# Patient Record
Sex: MALE | Race: WHITE | NOT HISPANIC OR LATINO | Employment: FULL TIME | URBAN - METROPOLITAN AREA
[De-identification: names, ages, dates, MRNs, and addresses within clinical notes are randomized per-mention and may not be internally consistent; named-entity substitution may affect disease eponyms.]

---

## 2021-02-25 ENCOUNTER — APPOINTMENT (OUTPATIENT)
Dept: RADIOLOGY | Facility: MEDICAL CENTER | Age: 36
End: 2021-02-25
Attending: EMERGENCY MEDICINE
Payer: OTHER GOVERNMENT

## 2021-02-25 ENCOUNTER — HOSPITAL ENCOUNTER (EMERGENCY)
Facility: MEDICAL CENTER | Age: 36
End: 2021-02-25
Attending: EMERGENCY MEDICINE | Admitting: EMERGENCY MEDICINE
Payer: OTHER GOVERNMENT

## 2021-02-25 VITALS
TEMPERATURE: 96.8 F | WEIGHT: 170 LBS | RESPIRATION RATE: 18 BRPM | DIASTOLIC BLOOD PRESSURE: 89 MMHG | HEART RATE: 68 BPM | OXYGEN SATURATION: 100 % | BODY MASS INDEX: 24.34 KG/M2 | HEIGHT: 70 IN | SYSTOLIC BLOOD PRESSURE: 115 MMHG

## 2021-02-25 DIAGNOSIS — R00.2 PALPITATIONS: ICD-10-CM

## 2021-02-25 DIAGNOSIS — R07.9 CHEST PAIN, UNSPECIFIED TYPE: ICD-10-CM

## 2021-02-25 LAB
ALBUMIN SERPL BCP-MCNC: 4.7 G/DL (ref 3.2–4.9)
ALBUMIN/GLOB SERPL: 1.5 G/DL
ALP SERPL-CCNC: 47 U/L (ref 30–99)
ALT SERPL-CCNC: 24 U/L (ref 2–50)
ANION GAP SERPL CALC-SCNC: 13 MMOL/L (ref 7–16)
AST SERPL-CCNC: 22 U/L (ref 12–45)
BASOPHILS # BLD AUTO: 0.9 % (ref 0–1.8)
BASOPHILS # BLD: 0.06 K/UL (ref 0–0.12)
BILIRUB SERPL-MCNC: 0.5 MG/DL (ref 0.1–1.5)
BUN SERPL-MCNC: 16 MG/DL (ref 8–22)
CALCIUM SERPL-MCNC: 9 MG/DL (ref 8.4–10.2)
CHLORIDE SERPL-SCNC: 103 MMOL/L (ref 96–112)
CO2 SERPL-SCNC: 21 MMOL/L (ref 20–33)
CREAT SERPL-MCNC: 1.11 MG/DL (ref 0.5–1.4)
D DIMER PPP IA.FEU-MCNC: <0.27 UG/ML (FEU) (ref 0–0.5)
EKG IMPRESSION: NORMAL
EKG IMPRESSION: NORMAL
EOSINOPHIL # BLD AUTO: 0.12 K/UL (ref 0–0.51)
EOSINOPHIL NFR BLD: 1.7 % (ref 0–6.9)
ERYTHROCYTE [DISTWIDTH] IN BLOOD BY AUTOMATED COUNT: 37.2 FL (ref 35.9–50)
GLOBULIN SER CALC-MCNC: 3.1 G/DL (ref 1.9–3.5)
GLUCOSE SERPL-MCNC: 108 MG/DL (ref 65–99)
HCT VFR BLD AUTO: 47.4 % (ref 42–52)
HGB BLD-MCNC: 16.1 G/DL (ref 14–18)
IMM GRANULOCYTES # BLD AUTO: 0.02 K/UL (ref 0–0.11)
IMM GRANULOCYTES NFR BLD AUTO: 0.3 % (ref 0–0.9)
LYMPHOCYTES # BLD AUTO: 3.52 K/UL (ref 1–4.8)
LYMPHOCYTES NFR BLD: 50.1 % (ref 22–41)
MCH RBC QN AUTO: 28 PG (ref 27–33)
MCHC RBC AUTO-ENTMCNC: 34 G/DL (ref 33.7–35.3)
MCV RBC AUTO: 82.3 FL (ref 81.4–97.8)
MONOCYTES # BLD AUTO: 0.63 K/UL (ref 0–0.85)
MONOCYTES NFR BLD AUTO: 9 % (ref 0–13.4)
NEUTROPHILS # BLD AUTO: 2.68 K/UL (ref 1.82–7.42)
NEUTROPHILS NFR BLD: 38 % (ref 44–72)
NRBC # BLD AUTO: 0 K/UL
NRBC BLD-RTO: 0 /100 WBC
PLATELET # BLD AUTO: 228 K/UL (ref 164–446)
PMV BLD AUTO: 9.5 FL (ref 9–12.9)
POTASSIUM SERPL-SCNC: 3.4 MMOL/L (ref 3.6–5.5)
PROT SERPL-MCNC: 7.8 G/DL (ref 6–8.2)
RBC # BLD AUTO: 5.76 M/UL (ref 4.7–6.1)
SODIUM SERPL-SCNC: 137 MMOL/L (ref 135–145)
TROPONIN T SERPL-MCNC: <6 NG/L (ref 6–19)
TROPONIN T SERPL-MCNC: <6 NG/L (ref 6–19)
WBC # BLD AUTO: 7 K/UL (ref 4.8–10.8)

## 2021-02-25 PROCEDURE — 85379 FIBRIN DEGRADATION QUANT: CPT

## 2021-02-25 PROCEDURE — 84484 ASSAY OF TROPONIN QUANT: CPT

## 2021-02-25 PROCEDURE — 80053 COMPREHEN METABOLIC PANEL: CPT

## 2021-02-25 PROCEDURE — 99284 EMERGENCY DEPT VISIT MOD MDM: CPT

## 2021-02-25 PROCEDURE — 96374 THER/PROPH/DIAG INJ IV PUSH: CPT

## 2021-02-25 PROCEDURE — 36415 COLL VENOUS BLD VENIPUNCTURE: CPT

## 2021-02-25 PROCEDURE — 96375 TX/PRO/DX INJ NEW DRUG ADDON: CPT

## 2021-02-25 PROCEDURE — 700111 HCHG RX REV CODE 636 W/ 250 OVERRIDE (IP): Performed by: EMERGENCY MEDICINE

## 2021-02-25 PROCEDURE — 71045 X-RAY EXAM CHEST 1 VIEW: CPT

## 2021-02-25 PROCEDURE — 85025 COMPLETE CBC W/AUTO DIFF WBC: CPT

## 2021-02-25 PROCEDURE — 93005 ELECTROCARDIOGRAM TRACING: CPT

## 2021-02-25 PROCEDURE — 93005 ELECTROCARDIOGRAM TRACING: CPT | Performed by: EMERGENCY MEDICINE

## 2021-02-25 RX ORDER — ASPIRIN 81 MG/1
324 TABLET, CHEWABLE ORAL DAILY
Status: DISCONTINUED | OUTPATIENT
Start: 2021-02-25 | End: 2021-02-25 | Stop reason: HOSPADM

## 2021-02-25 RX ORDER — KETOROLAC TROMETHAMINE 30 MG/ML
15 INJECTION, SOLUTION INTRAMUSCULAR; INTRAVENOUS ONCE
Status: COMPLETED | OUTPATIENT
Start: 2021-02-25 | End: 2021-02-25

## 2021-02-25 RX ORDER — LORAZEPAM 2 MG/ML
1 INJECTION INTRAMUSCULAR ONCE
Status: COMPLETED | OUTPATIENT
Start: 2021-02-25 | End: 2021-02-25

## 2021-02-25 RX ADMIN — KETOROLAC TROMETHAMINE 15 MG: 30 INJECTION, SOLUTION INTRAMUSCULAR at 16:42

## 2021-02-25 RX ADMIN — LORAZEPAM 1 MG: 2 INJECTION INTRAMUSCULAR; INTRAVENOUS at 15:13

## 2021-02-25 ASSESSMENT — HEART SCORE
AGE: <45
TROPONIN: LESS THAN OR EQUAL TO NORMAL LIMIT
HISTORY: SLIGHTLY SUSPICIOUS
HEART SCORE: 2
RISK FACTORS: NO KNOWN RISK FACTORS
ECG: SIGNIFICANT ST-DEPRESSION

## 2021-02-25 NOTE — ED TRIAGE NOTES
Pt to er with c/o palpitations pta , ekg in trage=st depression with pt roomed immed per erp. Pt denies c/o, erp to bedside. Wife states asa 650 pta

## 2021-02-25 NOTE — ED NOTES
"Pt amb to triage c/o cp, sob. Pt's wife at  states that she 'kneeded a knot from his left scapula\" lst night and pain progressivley worsening since last noc. EKG compl in triage and pt brought directly to rm#8b.   "

## 2021-02-25 NOTE — ED PROVIDER NOTES
ED Provider Note    CHIEF COMPLAINT  Chief Complaint   Patient presents with    Anxiety    Palpitations       HPI  Cameron Jeffries is a 35 y.o. male who presents for evaluation of anxiety palpitations chest discomfort.  The patient reports that he was getting a deep tissue massage on his upper thoracic posterior region and thinks his discomfort and symptoms may be related to that.  Nonetheless he presents here with chest discomfort palpitations and anxiety.  The patient has no stated medical or surgical history.  EKG was performed in triage demonstrating ST depression laterally and he was immediately brought back.  He has no known cardiopulmonary disease history.  He denies history of blood clots.  No pleuritic chest pain or hemoptysis.  He reports anxiety palpitations and some substernal chest pressure.  It does not radiate to the back.  Is not associated with any neurological symptoms.  He denies any cocaine or amphetamines.    REVIEW OF SYSTEMS  See HPI for further details.  No fevers chills night sweats weight loss all other systems are negative.     PAST MEDICAL HISTORY  No past medical history on file.  Stated medical history  FAMILY HISTORY  Positive family history in father for early coronary artery disease    SOCIAL HISTORY  Social History     Socioeconomic History    Marital status:      Spouse name: Not on file    Number of children: Not on file    Years of education: Not on file    Highest education level: Not on file   Occupational History    Not on file   Tobacco Use    Smoking status: Never Smoker    Smokeless tobacco: Never Used   Substance and Sexual Activity    Alcohol use: Never    Drug use: Never    Sexual activity: Not on file   Other Topics Concern    Not on file   Social History Narrative    Not on file     Social Determinants of Health     Financial Resource Strain:     Difficulty of Paying Living Expenses:    Food Insecurity:     Worried About Running Out of Food in the Last Year:      "Ran Out of Food in the Last Year:    Transportation Needs:     Lack of Transportation (Medical):     Lack of Transportation (Non-Medical):    Physical Activity:     Days of Exercise per Week:     Minutes of Exercise per Session:    Stress:     Feeling of Stress :    Social Connections:     Frequency of Communication with Friends and Family:     Frequency of Social Gatherings with Friends and Family:     Attends [employer] Services:     Active Member of Clubs or Organizations:     Attends Club or Organization Meetings:     Marital Status:    Intimate Partner Violence:     Fear of Current or Ex-Partner:     Emotionally Abused:     Physically Abused:     Sexually Abused:      No alcohol, positive for Nicorette gum  SURGICAL HISTORY  No past surgical history on file.    CURRENT MEDICATIONS  Home Medications       Reviewed by Karime Seo R.N. (Registered Nurse) on 02/25/21 at 1506  Med List Status: Partial     Medication Last Dose Status        Patient Inocente Taking any Medications                       Patient took full-strength aspirin prior to arrival    ALLERGIES  No Known Allergies    PHYSICAL EXAM  VITAL SIGNS: /89   Pulse 68   Temp 36 °C (96.8 °F) (Temporal)   Resp 18   Ht 1.778 m (5' 10\")   Wt 77.1 kg (170 lb)   SpO2 100%   BMI 24.39 kg/m²       Constitutional: Well developed, Well nourished, mild distress  HENT: Normocephalic, Atraumatic, Bilateral external ears normal, Oropharynx moist, No oral exudates, Nose normal.   Eyes: PERRLA, EOMI, Conjunctiva normal, No discharge.   Neck: Normal range of motion, No tenderness, Supple, No stridor.    Cardiovascular: Relative tachycardia, Normal rhythm, No murmurs, No rubs, No gallops.   Thorax & Lungs: Normal breath sounds, No respiratory distress, No wheezing, No chest tenderness.   Abdomen: Bowel sounds normal, Soft, No tenderness, No masses, No pulsatile masses.   Skin: Warm, Dry, No erythema, No rash.   Back: No tenderness, No CVA tenderness. "   Extremities: Intact distal pulses, No edema, No tenderness, No cyanosis, No clubbing.   Neurologic: Alert & oriented x 3, Normal motor function, Normal sensory function, No focal deficits noted.   Psychiatric: Anxious  Results for orders placed or performed during the hospital encounter of 02/25/21   CBC with Differential   Result Value Ref Range    WBC 7.0 4.8 - 10.8 K/uL    RBC 5.76 4.70 - 6.10 M/uL    Hemoglobin 16.1 14.0 - 18.0 g/dL    Hematocrit 47.4 42.0 - 52.0 %    MCV 82.3 81.4 - 97.8 fL    MCH 28.0 27.0 - 33.0 pg    MCHC 34.0 33.7 - 35.3 g/dL    RDW 37.2 35.9 - 50.0 fL    Platelet Count 228 164 - 446 K/uL    MPV 9.5 9.0 - 12.9 fL    Neutrophils-Polys 38.00 (L) 44.00 - 72.00 %    Lymphocytes 50.10 (H) 22.00 - 41.00 %    Monocytes 9.00 0.00 - 13.40 %    Eosinophils 1.70 0.00 - 6.90 %    Basophils 0.90 0.00 - 1.80 %    Immature Granulocytes 0.30 0.00 - 0.90 %    Nucleated RBC 0.00 /100 WBC    Neutrophils (Absolute) 2.68 1.82 - 7.42 K/uL    Lymphs (Absolute) 3.52 1.00 - 4.80 K/uL    Monos (Absolute) 0.63 0.00 - 0.85 K/uL    Eos (Absolute) 0.12 0.00 - 0.51 K/uL    Baso (Absolute) 0.06 0.00 - 0.12 K/uL    Immature Granulocytes (abs) 0.02 0.00 - 0.11 K/uL    NRBC (Absolute) 0.00 K/uL   Complete Metabolic Panel (CMP)   Result Value Ref Range    Sodium 137 135 - 145 mmol/L    Potassium 3.4 (L) 3.6 - 5.5 mmol/L    Chloride 103 96 - 112 mmol/L    Co2 21 20 - 33 mmol/L    Anion Gap 13.0 7.0 - 16.0    Glucose 108 (H) 65 - 99 mg/dL    Bun 16 8 - 22 mg/dL    Creatinine 1.11 0.50 - 1.40 mg/dL    Calcium 9.0 8.4 - 10.2 mg/dL    AST(SGOT) 22 12 - 45 U/L    ALT(SGPT) 24 2 - 50 U/L    Alkaline Phosphatase 47 30 - 99 U/L    Total Bilirubin 0.5 0.1 - 1.5 mg/dL    Albumin 4.7 3.2 - 4.9 g/dL    Total Protein 7.8 6.0 - 8.2 g/dL    Globulin 3.1 1.9 - 3.5 g/dL    A-G Ratio 1.5 g/dL   Troponin   Result Value Ref Range    Troponin T <6 6 - 19 ng/L   D-DIMER   Result Value Ref Range    D-Dimer Screen <0.27 0.00 - 0.50 ug/mL (FEU)    ESTIMATED GFR   Result Value Ref Range    GFR If African American >60 >60 mL/min/1.73 m 2    GFR If Non African American >60 >60 mL/min/1.73 m 2   TROPONIN   Result Value Ref Range    Troponin T <6 6 - 19 ng/L   EKG   Result Value Ref Range    Report       [facility] Emergency Dept.    Test Date:  2021  Pt Name:    LOUISA VACA                Department: EDS  MRN:        [id number]                      Room:       [facility] 8  Gender:     Male                         Technician: RL  :        1985                   Requested By:ER TRIAGE PROTOCOL  Order #:    [id number]                    Reading MD: HI KATZ MD    Measurements  Intervals                                Axis  Rate:       97                           P:          72  NV:         147                          QRS:        53  QRSD:       102                          T:          26  QT:         364  QTc:        463    Interpretive Statements  Sinus rhythm  Minimal ST depression, diffuse leads  No previous ECG available for comparison  Electronically Signed On 2021 17:54:52 PST by HI KATZ MD     EKG   Result Value Ref Range    Report       [facility] Emergency Dept.    Test Date:  2021  Pt Name:    LOUISA VACA                Department: EDS  MRN:        [id number]                      Room:       [facility] 8  Gender:     Male                         Technician: HRR  :        1985                   Requested By:ER TRIAGE PROTOCOL  Order #:    [id number]                    Reading MD: HI KATZ MD    Measurements  Intervals                                Axis  Rate:       71                           P:          14  NV:         144                          QRS:        39  QRSD:       85                           T:          11  QT:         401  QTc:        436    Interpretive Statements  Sinus rhythm  Compared to ECG 2021 14:46:47  ST (T wave) deviation  no longer present  Electronically Signed On 2- 17:54:50 PST by TOM HOWARD MD        EKG interpretation by me rate 97 sinus rhythm ST depression notably in the lateral and inferior leads no acute ST segment elevation.  No pathological T wave inversions.  Intervals and axis are otherwise normal.  No old EKG to compare    RADIOLOGY/PROCEDURES  DX-CHEST-PORTABLE (1 VIEW)   Final Result      No radiographic evidence of acute cardiopulmonary process.            COURSE & MEDICAL DECISION MAKING  Pertinent Labs & Imaging studies reviewed. (See chart for details)  Patient presents here with chest discomfort that started during a massage.  Differential diagnosis was extensive including angina, acute coronary syndrome, pneumothorax, pulmonary embolism, esophagitis, musculoskeletal pain.  He had extensive work-up here.  Initial EKG did demonstrate some subtle albeit nonspecific ST depression around half millimeter in the lateral leads.  There is no ST segment elevation or pathological T wave inversions.  D-dimer is negative, and delta 2-hour double troponins are both nondetectable.  Chest x-ray does not demonstrate any widened mediastinum or evidence of pneumonia, rib fractures, aortic dissection.  He is neurologically intact.  He does not endorse any cocaine or amphetamines.  His heart score is 2 for EKG changes and family history.  This still places him at extremely low risk.  After treatment with Ativan and Toradol he feels much better.  I feel that this is unlikely to be cardiac in etiology but counseled the patient to return if he develops any new or worsening symptoms such as crushing chest pain dyspnea on exertion diaphoresis etc.    FINAL IMPRESSION  1.   1. Palpitations     2. Chest pain, unspecified type                Electronically signed by: Tom Howard M.D., 2/25/2021 3:07 PM

## 2021-02-25 NOTE — ED NOTES
Pt rounding, states improvement in previous s/s. Observed talking on phone, call light in reach w/o distress, denies needs

## 2021-02-26 ENCOUNTER — TELEPHONE (OUTPATIENT)
Dept: SCHEDULING | Facility: IMAGING CENTER | Age: 36
End: 2021-02-26

## 2021-02-26 ENCOUNTER — HOSPITAL ENCOUNTER (EMERGENCY)
Facility: MEDICAL CENTER | Age: 36
End: 2021-02-26
Attending: EMERGENCY MEDICINE | Admitting: EMERGENCY MEDICINE
Payer: OTHER GOVERNMENT

## 2021-02-26 VITALS
TEMPERATURE: 97.1 F | SYSTOLIC BLOOD PRESSURE: 130 MMHG | RESPIRATION RATE: 16 BRPM | OXYGEN SATURATION: 98 % | WEIGHT: 169.75 LBS | HEART RATE: 70 BPM | DIASTOLIC BLOOD PRESSURE: 86 MMHG | HEIGHT: 69 IN | BODY MASS INDEX: 25.14 KG/M2

## 2021-02-26 DIAGNOSIS — F41.9 ANXIETY: ICD-10-CM

## 2021-02-26 DIAGNOSIS — R07.9 CHEST PAIN, UNSPECIFIED TYPE: ICD-10-CM

## 2021-02-26 LAB
EKG IMPRESSION: NORMAL
TROPONIN T SERPL-MCNC: <6 NG/L (ref 6–19)

## 2021-02-26 PROCEDURE — 36415 COLL VENOUS BLD VENIPUNCTURE: CPT

## 2021-02-26 PROCEDURE — 99283 EMERGENCY DEPT VISIT LOW MDM: CPT

## 2021-02-26 PROCEDURE — 93005 ELECTROCARDIOGRAM TRACING: CPT

## 2021-02-26 PROCEDURE — 84484 ASSAY OF TROPONIN QUANT: CPT

## 2021-02-26 PROCEDURE — 93005 ELECTROCARDIOGRAM TRACING: CPT | Performed by: EMERGENCY MEDICINE

## 2021-02-26 RX ORDER — IBUPROFEN 200 MG
200 TABLET ORAL EVERY 6 HOURS PRN
Status: SHIPPED | COMMUNITY
End: 2021-03-01

## 2021-02-26 RX ORDER — ASPIRIN 325 MG
650 TABLET ORAL EVERY 6 HOURS PRN
Status: SHIPPED | COMMUNITY
End: 2021-03-01

## 2021-02-26 RX ORDER — LORAZEPAM 1 MG/1
1 TABLET ORAL EVERY 6 HOURS PRN
Qty: 12 TABLET | Refills: 0 | Status: SHIPPED | OUTPATIENT
Start: 2021-02-26 | End: 2021-03-01

## 2021-02-26 ASSESSMENT — FIBROSIS 4 INDEX: FIB4 SCORE: 0.73

## 2021-02-26 NOTE — DISCHARGE INSTRUCTIONS
Return immediately for chest pressure, sweating chest pain with exertion or any new or worsening symptoms.

## 2021-02-26 NOTE — ED NOTES
Pt provided with discharge paper work. Pt declines any questions at this time. Pt ambulated out of ER without complication.

## 2021-02-27 NOTE — ED PROVIDER NOTES
"ED Provider Note    CHIEF COMPLAINT  Chief Complaint   Patient presents with   • Anxiety     Reports he was recently seen here for a panic attack and \"they didn't prescribe me anthing, I have an appointment with my doctor but it's not until monday\".    • Chest Pain     Reports experiencing \"chest tightness while driving around [place] today\".        HPI  Cameron Jeffries is a 35 y.o. male who presents to the emergency department with a complaint of recurrent chest discomfort.  The patient was here yesterday with the same symptoms of chest tightness and anxiety he was evaluated and treated with Toradol and Ativan which was very helpful he says but he had recurrent episode today and he says that he was not given any medication for anxiety which he feels he needs.  Today the patient was driving when he had this onset of symptoms with chest tightness and rapid heart rate he felt very anxious.  He called his  and spoke to his  on the phone for a while and this was helpful he did calm down and his symptoms have essentially resolved now that he is come in for reevaluation and would like medication for anxiety for the weekend.  The patient has an appointment with his primary care doctor on Monday for recheck    REVIEW OF SYSTEMS no fever chills cough hemoptysis or shortness of breath no known Covid exposure.  All other systems negative    PAST MEDICAL HISTORY  Past Medical History:   Diagnosis Date   • Patient denies medical problems        FAMILY HISTORY  History reviewed. No pertinent family history.   Patient's father developed coronary artery disease at the age of 41    SOCIAL HISTORY  Social History     Socioeconomic History   • Marital status:      Spouse name: Not on file   • Number of children: Not on file   • Years of education: Not on file   • Highest education level: Not on file   Occupational History   • Not on file   Tobacco Use   • Smoking status: Never Smoker   • Smokeless tobacco: Former " "User   Substance and Sexual Activity   • Alcohol use: Never   • Drug use: Never   • Sexual activity: Not on file   Other Topics Concern   • Not on file   Social History Narrative   • Not on file     Social Determinants of Health     Financial Resource Strain:    • Difficulty of Paying Living Expenses:    Food Insecurity:    • Worried About Running Out of Food in the Last Year:    • Ran Out of Food in the Last Year:    Transportation Needs:    • Lack of Transportation (Medical):    • Lack of Transportation (Non-Medical):    Physical Activity:    • Days of Exercise per Week:    • Minutes of Exercise per Session:    Stress:    • Feeling of Stress :    Social Connections:    • Frequency of Communication with Friends and Family:    • Frequency of Social Gatherings with Friends and Family:    • Attends [employer] Services:    • Active Member of Clubs or Organizations:    • Attends Club or Organization Meetings:    • Marital Status:    Intimate Partner Violence:    • Fear of Current or Ex-Partner:    • Emotionally Abused:    • Physically Abused:    • Sexually Abused:        SURGICAL HISTORY  History reviewed. No pertinent surgical history.    CURRENT MEDICATIONS  Home Medications     Reviewed by Lewis Delaney (Pharmacy Tech) on 02/26/21 at 1643  Med List Status: Complete   Medication Last Dose Status   aspirin (ASA) 325 MG Tab 2/25/2021 Active   ibuprofen (MOTRIN) 200 MG Tab 2/25/2021 Active                ALLERGIES  No Known Allergies    PHYSICAL EXAM  VITAL SIGNS: /86   Pulse 70   Temp 36.2 °C (97.1 °F) (Temporal)   Resp 16   Ht 1.753 m (5' 9\")   Wt 77 kg (169 lb 12.1 oz)   SpO2 98%   BMI 25.07 kg/m²    Oxygen saturation is interpreted as adequate  Constitutional: Awake very pleasant individual in no distress  Eyes: No erythema discharge or jaundice  Neck: Trachea midline no JVD  Cardiovascular: Regular rate and rhythm  Lungs: Clear and equal bilaterally with no apparent difficulty " breathing  Abdomen/Back: Soft nontender nondistended no rebound guarding or peritoneal findings  Skin: Warm and dry  Musculoskeletal: No acute bony deformity  Neurologic: Awake and verbal moving all extremities without difficulty    CHART REVIEW  I reviewed yesterday's ER chart the patient was thoroughly evaluated including laboratory testing with a normal D-dimer and to normal troponins and an unremarkable chest x-ray.    Laboratory  Today in the emergency department a troponin remains entirely normal at less than six    EKG interpretation  Twelve-lead EKG shows sinus rhythm 84 bpm no pathologic ST elevation depression or ectopy and I did review yesterday's EKG as well which did show diffuse ST depression in multiple leads but no evidence of ST elevation.    Radiology  As noted above yesterday's chest x-ray was unremarkable so that was not repeated today    MEDICAL DECISION MAKING and DISPOSITION  In the emergency department the patient's EKG does not suggest acute ischemia.  His troponin remains entirely normal.  The patient's heart score is one for family history so he is at extremely low risk for developing a myocardial infarction.  At this point in time I think that it is safe to send him home he would like a prescription for anxiety so I have discussed risks and benefits of use of controlled substances with him and informed consent obtained and the patient was given a 3-day prescription for Ativan.  If the patient feels that he is suffering new or worsening symptoms he is to return immediately for recheck and otherwise he is to keep his appointment with his doctor on Monday    IMPRESSION  1.  Chest pain  2.  Anxiety      Electronically signed by: Juan Campoverde M.D., 2/26/2021 8:46 PM

## 2021-02-27 NOTE — ED NOTES
Pharmacy Medication Reconciliation      Medication reconciliation updated and complete per pt at bedside  Allergies have been verified  No oral ABX within the last 14 days  Patient home pharmacy:[facility]

## 2021-02-27 NOTE — DISCHARGE INSTRUCTIONS
Return here if you have new or worsening symptoms or pain is out of control otherwise see your doctor on Monday as planned for recheck and further evaluation

## 2021-03-01 ENCOUNTER — TELEMEDICINE (OUTPATIENT)
Dept: MEDICAL GROUP | Facility: MEDICAL CENTER | Age: 36
End: 2021-03-01
Payer: OTHER GOVERNMENT

## 2021-03-01 VITALS — HEIGHT: 69 IN | BODY MASS INDEX: 25.18 KG/M2 | WEIGHT: 170 LBS

## 2021-03-01 DIAGNOSIS — F43.10 PTSD (POST-TRAUMATIC STRESS DISORDER): ICD-10-CM

## 2021-03-01 DIAGNOSIS — F33.2 SEVERE EPISODE OF RECURRENT MAJOR DEPRESSIVE DISORDER, WITHOUT PSYCHOTIC FEATURES (HCC): ICD-10-CM

## 2021-03-01 DIAGNOSIS — F41.9 ANXIETY: ICD-10-CM

## 2021-03-01 DIAGNOSIS — A60.01 HERPES SIMPLEX INFECTION OF PENIS: ICD-10-CM

## 2021-03-01 DIAGNOSIS — Z13.6 SCREENING FOR CARDIOVASCULAR CONDITION: ICD-10-CM

## 2021-03-01 PROCEDURE — 99204 OFFICE O/P NEW MOD 45 MIN: CPT | Performed by: INTERNAL MEDICINE

## 2021-03-01 RX ORDER — VALACYCLOVIR HYDROCHLORIDE 500 MG/1
500 TABLET, FILM COATED ORAL 2 TIMES DAILY
Qty: 10 TABLET | Refills: 2 | Status: SHIPPED | OUTPATIENT
Start: 2021-03-01 | End: 2021-03-06

## 2021-03-01 RX ORDER — HYDROXYZINE 50 MG/1
50-100 TABLET, FILM COATED ORAL EVERY 8 HOURS PRN
Qty: 90 TABLET | Refills: 0 | Status: SHIPPED | OUTPATIENT
Start: 2021-03-01 | End: 2021-04-01

## 2021-03-01 RX ORDER — ESCITALOPRAM OXALATE 10 MG/1
10 TABLET ORAL DAILY
Qty: 30 TABLET | Refills: 3 | Status: SHIPPED | OUTPATIENT
Start: 2021-03-01 | End: 2021-03-12

## 2021-03-01 ASSESSMENT — ANXIETY QUESTIONNAIRES
7. FEELING AFRAID AS IF SOMETHING AWFUL MIGHT HAPPEN: MORE THAN HALF THE DAYS
GAD7 TOTAL SCORE: 20
5. BEING SO RESTLESS THAT IT IS HARD TO SIT STILL: NEARLY EVERY DAY
3. WORRYING TOO MUCH ABOUT DIFFERENT THINGS: NEARLY EVERY DAY
4. TROUBLE RELAXING: NEARLY EVERY DAY
6. BECOMING EASILY ANNOYED OR IRRITABLE: NEARLY EVERY DAY
2. NOT BEING ABLE TO STOP OR CONTROL WORRYING: NEARLY EVERY DAY
1. FEELING NERVOUS, ANXIOUS, OR ON EDGE: NEARLY EVERY DAY

## 2021-03-01 ASSESSMENT — PATIENT HEALTH QUESTIONNAIRE - PHQ9
SUM OF ALL RESPONSES TO PHQ QUESTIONS 1-9: 21
5. POOR APPETITE OR OVEREATING: 3 - NEARLY EVERY DAY
CLINICAL INTERPRETATION OF PHQ2 SCORE: 6

## 2021-03-01 ASSESSMENT — FIBROSIS 4 INDEX: FIB4 SCORE: 0.73

## 2021-03-02 DIAGNOSIS — F41.9 ANXIETY: ICD-10-CM

## 2021-03-02 RX ORDER — ALPRAZOLAM 0.5 MG/1
0.5 TABLET ORAL
Qty: 15 TABLET | Refills: 0 | Status: SHIPPED | OUTPATIENT
Start: 2021-03-02 | End: 2021-03-17

## 2021-03-02 NOTE — PROGRESS NOTES
Virtual Visit: New Patient   This visit was conducted via Zoom using secure and encrypted videoconferencing technology. The patient was in a private location in the state of [place].    The patient's identity was confirmed and verbal consent was obtained for this virtual visit.    Subjective:     CC:   Chief Complaint   Patient presents with   • Establish Care   • Anxiety     Anxiety attack er 2x in 4 days   • Depression       Cameron Jeffries is a 35 y.o. male presenting to establish care and to discuss the evaluation and management of:    Severe episode of recurrent major depressive disorder, without psychotic features (HCC)  Hx of recurrent depression, denies SI/HI    Anxiety  Hx of anxiety attacks 2 x in one week  He tried lorazepam which did not help that much    PTSD (post-traumatic stress disorder)  Hx of PTSD from [place] while in [employer].    Herpes simplex infection of penis  Hx of genital herpes of penis    Depression Screening    Little interest or pleasure in doing things?  3 - nearly every day   Feeling down, depressed , or hopeless? 3 - nearly every day   Trouble falling or staying asleep, or sleeping too much?  3 - nearly every day   Feeling tired or having little energy?  3 - nearly every day   Poor appetite or overeating?  3 - nearly every day   Feeling bad about yourself - or that you are a failure or have let yourself or your family down? 3 - nearly every day   Trouble concentrating on things, such as reading the newspaper or watching television? 3 - nearly every day   Moving or speaking so slowly that other people could have noticed.  Or the opposite - being so fidgety or restless that you have been moving around a lot more than usual?  0 - not at all   Thoughts that you would be better off dead, or of hurting yourself?  0 - not at all   Patient Health Questionnaire Score: 21       If depressive symptoms identified deferred to follow up visit unless specifically addressed in assesment and  plan.    Interpretation of PHQ-9 Total Score   Score Severity   1-4 No Depression   5-9 Mild Depression   10-14 Moderate Depression   15-19 Moderately Severe Depression   20-27 Severe Depression    ZACHARIAH-7 Questionnaire    Feeling nervous, anxious, or on edge: Nearly every day  Not being able to sop or control worrying: Nearly every day  Worrying too much about different things: Nearly every day  Trouble relaxing: Nearly every day  Being so restless that it's hard to sit still: Nearly every day  Becoming easily annoyed or irritable: Nearly every day  Feeling afraid as if something awful might happen: More than half the days  Total: 20    Interpretation of ZACHARIAH 7 Total Score   Score Severity :  0-4 No Anxiety   5-9 Mild Anxiety  10-14 Moderate Anxiety  15-21 Severe Anxiety      ROS  See HPI  Constitutional: Negative for fever, chills and malaise/fatigue.   HENT: Negative for congestion.    Eyes: Negative for pain.   Respiratory: Negative for cough and shortness of breath.    Cardiovascular: Negative for leg swelling.   Gastrointestinal: Negative for nausea, vomiting, abdominal pain and diarrhea.   Genitourinary: Negative for dysuria and hematuria.   Skin: Negative for rash.   Neurological: Negative for dizziness, focal weakness and headaches.   Endo/Heme/Allergies: Does not bruise/bleed easily.   Psychiatric/Behavioral: anxious, depression, denies SI/HI    No Known Allergies    Current medicines (including changes today)  Current Outpatient Medications   Medication Sig Dispense Refill   • escitalopram (LEXAPRO) 10 MG Tab Take 1 tablet by mouth every day. 30 tablet 3   • hydrOXYzine HCl (ATARAX) 50 MG Tab Take 1-2 Tablets by mouth every 8 hours as needed for Anxiety (anxiety attack). 90 tablet 0   • valACYclovir (VALTREX) 500 MG Tab Take 1 tablet by mouth 2 times a day for 5 days. 10 tablet 2     No current facility-administered medications for this visit.       He  has a past medical history of Patient denies medical  "problems.  He  has no past surgical history on file.      Family History   Problem Relation Age of Onset   • Stroke Father 45   • Hypertension Father    • Hyperlipidemia Father    • Psychiatric Illness Sister         depression   • Cancer Paternal Grandmother 60        breast    • Dementia Paternal Grandmother         alzheimers   • Alcohol abuse Paternal Grandfather      Family Status   Relation Name Status   • Fa  (Not Specified)   • Sis  (Not Specified)   • PGMo  (Not Specified)   • PGFa  (Not Specified)       Patient Active Problem List    Diagnosis Date Noted   • PTSD (post-traumatic stress disorder) 03/01/2021   • Anxiety 03/01/2021   • Severe episode of recurrent major depressive disorder, without psychotic features (HCC) 03/01/2021   • Herpes simplex infection of penis 03/01/2021          Objective:   Ht 1.753 m (5' 9\")   Wt 77.1 kg (170 lb)   BMI 25.10 kg/m²     Physical Exam:  Constitutional: Alert, no distress, well-groomed.  Skin: No rashes in visible areas.  Eye: Round. Conjunctiva clear, lids normal. No icterus.   ENMT: Lips pink without lesions, good dentition, moist mucous membranes. Phonation normal.  Neck: No masses, no thyromegaly. Moves freely without pain.  Respiratory: Unlabored respiratory effort, no cough or audible wheeze  Psych: Alert and oriented x3, normal affect and mood.       Assessment and Plan:   The following treatment plan was discussed:     1. Severe episode of recurrent major depressive disorder, without psychotic features (HCC)  2. Anxiety  - Patient has been identified as having a positive depression screening. Appropriate orders and counseling have been given.  - escitalopram (LEXAPRO) 10 MG Tab; Take 1 tablet by mouth every day.  Dispense: 30 tablet; Refill: 3  - REFERRAL TO PSYCHIATRY  - REFERRAL TO PSYCHOLOGY  · Medication options, alternatives (including no medications) and medication risks/benefits/side effects were discussed in detail.  · The patient was advised to " call, message provider on [facility], or come in to the clinic if symptoms worsen or if any future questions/issues regarding their medications arise; the patient verbalized understanding and agreement.    · The patient was educated to call 911, call the suicide hotline, or go to local ER if having thoughts of suicide or homicide; Patient verbalized understanding.    3. PTSD (post-traumatic stress disorder)  - escitalopram (LEXAPRO) 10 MG Tab; Take 1 tablet by mouth every day.  Dispense: 30 tablet; Refill: 3  - REFERRAL TO PSYCHIATRY  - REFERRAL TO PSYCHOLOGY    4. Herpes simplex infection of penis  - valACYclovir (VALTREX) 500 MG Tab; Take 1 tablet by mouth 2 times a day for 5 days.  Dispense: 10 tablet; Refill: 2    5. Screening for cardiovascular condition  - Lipid Profile; Future        Follow-up: Return in about 6 weeks (around 4/12/2021), or if symptoms worsen or fail to improve.

## 2021-03-03 NOTE — PROGRESS NOTES
Anxiety  Having anxiety attack, ativan does not help, making him drowsy.  Tried Atarax  Recommend to continue lexapro  The atarax which helped the anxiety attack to dissipate which takes an hour.  -     ALPRAZolam (XANAX) 0.5 MG Tab; Take 1 tablet by mouth 1 time a day as needed for Anxiety for up to 15 days. No driving or machinery work after taking alprazolam        Contact information for psychiatry provided to patient, recommend patient to schedule an appointment with psychiatry as soon as possible.

## 2021-03-04 ENCOUNTER — HOSPITAL ENCOUNTER (OUTPATIENT)
Dept: LAB | Facility: MEDICAL CENTER | Age: 36
End: 2021-03-04
Attending: INTERNAL MEDICINE
Payer: OTHER GOVERNMENT

## 2021-03-04 DIAGNOSIS — Z13.6 SCREENING FOR CARDIOVASCULAR CONDITION: ICD-10-CM

## 2021-03-04 DIAGNOSIS — F41.9 ANXIETY: ICD-10-CM

## 2021-03-04 LAB
ANION GAP SERPL CALC-SCNC: 9 MMOL/L (ref 7–16)
BUN SERPL-MCNC: 13 MG/DL (ref 8–22)
CALCIUM SERPL-MCNC: 9.3 MG/DL (ref 8.5–10.5)
CHLORIDE SERPL-SCNC: 107 MMOL/L (ref 96–112)
CHOLEST SERPL-MCNC: 130 MG/DL (ref 100–199)
CO2 SERPL-SCNC: 25 MMOL/L (ref 20–33)
CREAT SERPL-MCNC: 1.03 MG/DL (ref 0.5–1.4)
FASTING STATUS PATIENT QL REPORTED: NORMAL
GLUCOSE SERPL-MCNC: 92 MG/DL (ref 65–99)
HDLC SERPL-MCNC: 41 MG/DL
LDLC SERPL CALC-MCNC: 78 MG/DL
POTASSIUM SERPL-SCNC: 4.5 MMOL/L (ref 3.6–5.5)
SODIUM SERPL-SCNC: 141 MMOL/L (ref 135–145)
TRIGL SERPL-MCNC: 53 MG/DL (ref 0–149)
TSH SERPL DL<=0.005 MIU/L-ACNC: 2.87 UIU/ML (ref 0.38–5.33)

## 2021-03-04 PROCEDURE — 36415 COLL VENOUS BLD VENIPUNCTURE: CPT

## 2021-03-04 PROCEDURE — 80048 BASIC METABOLIC PNL TOTAL CA: CPT

## 2021-03-04 PROCEDURE — 80061 LIPID PANEL: CPT

## 2021-03-04 PROCEDURE — 84443 ASSAY THYROID STIM HORMONE: CPT

## 2021-03-12 ENCOUNTER — OFFICE VISIT (OUTPATIENT)
Dept: MEDICAL GROUP | Facility: MEDICAL CENTER | Age: 36
End: 2021-03-12
Payer: OTHER GOVERNMENT

## 2021-03-12 VITALS
WEIGHT: 174.16 LBS | HEART RATE: 80 BPM | BODY MASS INDEX: 25.8 KG/M2 | OXYGEN SATURATION: 98 % | TEMPERATURE: 96.9 F | DIASTOLIC BLOOD PRESSURE: 92 MMHG | RESPIRATION RATE: 16 BRPM | SYSTOLIC BLOOD PRESSURE: 110 MMHG | HEIGHT: 69 IN

## 2021-03-12 DIAGNOSIS — R00.2 HEART PALPITATIONS: ICD-10-CM

## 2021-03-12 DIAGNOSIS — F33.2 SEVERE EPISODE OF RECURRENT MAJOR DEPRESSIVE DISORDER, WITHOUT PSYCHOTIC FEATURES (HCC): ICD-10-CM

## 2021-03-12 DIAGNOSIS — F43.10 PTSD (POST-TRAUMATIC STRESS DISORDER): ICD-10-CM

## 2021-03-12 DIAGNOSIS — F41.9 ANXIETY: ICD-10-CM

## 2021-03-12 PROCEDURE — 99214 OFFICE O/P EST MOD 30 MIN: CPT | Performed by: INTERNAL MEDICINE

## 2021-03-12 RX ORDER — ESCITALOPRAM OXALATE 20 MG/1
20 TABLET ORAL DAILY
Qty: 30 TABLET | Refills: 0 | Status: SHIPPED | OUTPATIENT
Start: 2021-03-12 | End: 2021-04-01

## 2021-03-12 ASSESSMENT — ANXIETY QUESTIONNAIRES
4. TROUBLE RELAXING: MORE THAN HALF THE DAYS
3. WORRYING TOO MUCH ABOUT DIFFERENT THINGS: NEARLY EVERY DAY
6. BECOMING EASILY ANNOYED OR IRRITABLE: MORE THAN HALF THE DAYS
7. FEELING AFRAID AS IF SOMETHING AWFUL MIGHT HAPPEN: NEARLY EVERY DAY
2. NOT BEING ABLE TO STOP OR CONTROL WORRYING: SEVERAL DAYS
GAD7 TOTAL SCORE: 16
1. FEELING NERVOUS, ANXIOUS, OR ON EDGE: NEARLY EVERY DAY
5. BEING SO RESTLESS THAT IT IS HARD TO SIT STILL: MORE THAN HALF THE DAYS

## 2021-03-12 ASSESSMENT — PATIENT HEALTH QUESTIONNAIRE - PHQ9
SUM OF ALL RESPONSES TO PHQ QUESTIONS 1-9: 15
CLINICAL INTERPRETATION OF PHQ2 SCORE: 2
5. POOR APPETITE OR OVEREATING: 2 - MORE THAN HALF THE DAYS

## 2021-03-12 ASSESSMENT — FIBROSIS 4 INDEX: FIB4 SCORE: 0.73

## 2021-03-12 NOTE — PROGRESS NOTES
Established Patient    Cameron Jeffries is a 35 y.o. male who presents today with the following:    CC:   Chief Complaint   Patient presents with   • Follow-Up   • Anxiety       HPI:     Heart palpitations  Heart palpitations, intermittent, could last for one hour.  Once daily  Worse with anxiety  Recent EKG unremarkable  Will check Zio Patch            PTSD (post-traumatic stress disorder)  Hx of PTSD from [place] while in [employer].      Anxiety  Hx of anxiety attacks  GAD7 score 20->16  Increase Lexpro to 20 mg daily after doing 10 mg for one week.    Trial of hydroxyzine as needed, if does not help try xanax as needed  Pending psychiatry and psychology        Severe episode of recurrent major depressive disorder, without psychotic features (HCC)  Hx of recurrent depression, denies SI/HI  On Lexapro 10 mg daily for one week, then increase to 20 mg daily  Denies SI/HI    PHQ-9 score 21->15  Pending psychiatry and psychology        Depression Screening    Little interest or pleasure in doing things?  1 - several days   Feeling down, depressed , or hopeless? 1 - several days   Trouble falling or staying asleep, or sleeping too much?  3 - nearly every day   Feeling tired or having little energy?  2 - more than half the days   Poor appetite or overeating?  2 - more than half the days   Feeling bad about yourself - or that you are a failure or have let yourself or your family down? 3 - nearly every day   Trouble concentrating on things, such as reading the newspaper or watching television? 1 - several days   Moving or speaking so slowly that other people could have noticed.  Or the opposite - being so fidgety or restless that you have been moving around a lot more than usual?  2 - more than half the days   Thoughts that you would be better off dead, or of hurting yourself?  0 - not at all   Patient Health Questionnaire Score: 15       If depressive symptoms identified deferred to follow up visit unless specifically  addressed in assesment and plan.    Interpretation of PHQ-9 Total Score   Score Severity   1-4 No Depression   5-9 Mild Depression   10-14 Moderate Depression   15-19 Moderately Severe Depression   20-27 Severe Depression    ZACHARIAH-7 Questionnaire    Feeling nervous, anxious, or on edge: Nearly every day  Not being able to sop or control worrying: Several days  Worrying too much about different things: Nearly every day  Trouble relaxing: More than half the days  Being so restless that it's hard to sit still: More than half the days  Becoming easily annoyed or irritable: More than half the days  Feeling afraid as if something awful might happen: Nearly every day  Total: 16    Interpretation of ZACHARIAH 7 Total Score   Score Severity :  0-4 No Anxiety   5-9 Mild Anxiety  10-14 Moderate Anxiety  15-21 Severe Anxiety      Current Outpatient Medications   Medication Sig Dispense Refill   • escitalopram (LEXAPRO) 20 MG tablet Take 1 tablet by mouth every day. 30 tablet 0   • ALPRAZolam (XANAX) 0.5 MG Tab Take 1 tablet by mouth 1 time a day as needed for Anxiety for up to 15 days. No driving or machinery work after taking alprazolam 15 tablet 0   • hydrOXYzine HCl (ATARAX) 50 MG Tab Take 1-2 Tablets by mouth every 8 hours as needed for Anxiety (anxiety attack). 90 tablet 0     No current facility-administered medications for this visit.       Allergies, past medical history, past surgical history, medications, family history, social history reviewed and updated.    ROS   Constitutional: Denies fevers or chills  Eyes: Denies changes in vision  Ears/Nose/Throat/Mouth: Denies nasal congestion or sore throat   Cardiovascular: Denies chest pain or palpitations   Respiratory: Denies shortness of breath , Denies cough  Gastrointestinal/Hepatic: Denies abd pain, nausea, vomiting   Genitourinary: Denies dysuria or frequency  Musculoskeletal/Rheum: Denies joint pain and swelling   Neurological: Denies headache  Psychiatric: anxiety,  "depression   Endocrine: Denies hx of diabetes or thyroid dysfunction  Heme/Oncology/Lymph Nodes: Denies weight changes or enlarged LNs.    Physical Exam  Vitals: /92 (BP Location: Left arm, Patient Position: Sitting, BP Cuff Size: Adult)   Pulse 80   Temp 36.1 °C (96.9 °F) (Temporal)   Resp 16   Ht 1.753 m (5' 9\")   Wt 79 kg (174 lb 2.6 oz)   SpO2 98%   BMI 25.72 kg/m²   General: Alert, pleasant, NAD  HEENT: Normocephalic.  EOMI, no icterus or pallor.  Conjunctivae and lids normal. External ears normal. Oropharynx non-erythematous, mucous membranes moist.  Neck supple.  No thyromegaly or masses palpated.   Lymph: No cervical or supraclavicular lymphadenopathy.  Cardiovascular: Regular rate and rhythm.  S1 and S2 normal.  No murmurs appreciated.  Respiratory: Normal respiratory effort.  Clear to auscultation bilaterally.  Abdomen: Non-distended, soft  Skin: Warm, dry, no rashes.  Musculoskeletal: Gait is normal.  Moves all extremities well.  Extremities: No leg edema.    Psych:  Affect/mood is normal today, judgement is good, memory is intact, grooming is appropriate.      Labs (3/4/21) were reviewed and discussed with patients.  All questions were answered.      Assessment and Plan    1. Heart palpitations  Treat anxiety  - [facility] - Cardiology Performed (48[facility]); Future  - REFERRAL TO CARDIOLOGY    2. Anxiety  - escitalopram (LEXAPRO) 20 MG tablet; Take 1 tablet by mouth every day.  Dispense: 30 tablet; Refill: 0  Trial of hydroxyzine as needed, if does not help try xanax as needed  Pending psychiatry and psychology    3. Severe episode of recurrent major depressive disorder, without psychotic features (HCC)  - escitalopram (LEXAPRO) 20 MG tablet; Take 1 tablet by mouth every day.  Dispense: 30 tablet; Refill: 0  Pending psychiatry and psychology    4. PTSD (post-traumatic stress disorder)  - escitalopram (LEXAPRO) 20 MG tablet; Take 1 tablet by mouth every day.  Dispense: 30 tablet; Refill: 0  Pending " psychiatry and psychology    Follow-up:Return in about 2 months (around 5/12/2021), or if symptoms worsen or fail to improve.    This note was created using voice recognition software. There may be unintended errors in spelling, grammar or content.

## 2021-03-12 NOTE — ASSESSMENT & PLAN NOTE
Heart palpitations, intermittent, could last for one hour.  Once daily  Worse with anxiety  Recent EKG unremarkable  Will check Zio Patch

## 2021-03-12 NOTE — ASSESSMENT & PLAN NOTE
Hx of anxiety attacks  GAD7 score 20->16  Increase Lexpro to 20 mg daily after doing 10 mg for one week.    Trial of hydroxyzine as needed, if does not help try xanax as needed  Pending psychiatry and psychology

## 2021-03-12 NOTE — ASSESSMENT & PLAN NOTE
Hx of recurrent depression, denies SI/HI  On Lexapro 10 mg daily for one week, then increase to 20 mg daily  Denies SI/HI    PHQ-9 score 21->15  Pending psychiatry and psychology

## 2021-03-23 ENCOUNTER — TELEPHONE (OUTPATIENT)
Dept: CARDIOLOGY | Facility: MEDICAL CENTER | Age: 36
End: 2021-03-23

## 2021-03-23 ENCOUNTER — NON-PROVIDER VISIT (OUTPATIENT)
Dept: CARDIOLOGY | Facility: MEDICAL CENTER | Age: 36
End: 2021-03-23
Payer: OTHER GOVERNMENT

## 2021-03-23 DIAGNOSIS — R00.2 HEART PALPITATIONS: ICD-10-CM

## 2021-03-23 DIAGNOSIS — I47.20 VENTRICULAR TACHYCARDIA (HCC): ICD-10-CM

## 2021-03-23 NOTE — TELEPHONE ENCOUNTER
Home enrollment completed for the Zio XT Patch Holter monitoring program per Adelso Ortiz MD.  Monitor to be mailed to patient by [employer].    >Currently pending EOS.

## 2021-04-01 ENCOUNTER — TELEMEDICINE (OUTPATIENT)
Dept: BEHAVIORAL HEALTH | Facility: CLINIC | Age: 36
End: 2021-04-01
Payer: OTHER GOVERNMENT

## 2021-04-01 DIAGNOSIS — F33.2 SEVERE EPISODE OF RECURRENT MAJOR DEPRESSIVE DISORDER, WITHOUT PSYCHOTIC FEATURES (HCC): ICD-10-CM

## 2021-04-01 DIAGNOSIS — F43.10 PTSD (POST-TRAUMATIC STRESS DISORDER): ICD-10-CM

## 2021-04-01 DIAGNOSIS — F41.1 GENERALIZED ANXIETY DISORDER: ICD-10-CM

## 2021-04-01 PROCEDURE — 96127 BRIEF EMOTIONAL/BEHAV ASSMT: CPT | Performed by: PSYCHIATRY & NEUROLOGY

## 2021-04-01 PROCEDURE — 99204 OFFICE O/P NEW MOD 45 MIN: CPT | Performed by: PSYCHIATRY & NEUROLOGY

## 2021-04-01 ASSESSMENT — ANXIETY QUESTIONNAIRES
3. WORRYING TOO MUCH ABOUT DIFFERENT THINGS: SEVERAL DAYS
1. FEELING NERVOUS, ANXIOUS, OR ON EDGE: MORE THAN HALF THE DAYS
5. BEING SO RESTLESS THAT IT IS HARD TO SIT STILL: NOT AT ALL
4. TROUBLE RELAXING: SEVERAL DAYS
GAD7 TOTAL SCORE: 8
7. FEELING AFRAID AS IF SOMETHING AWFUL MIGHT HAPPEN: SEVERAL DAYS
2. NOT BEING ABLE TO STOP OR CONTROL WORRYING: MORE THAN HALF THE DAYS
6. BECOMING EASILY ANNOYED OR IRRITABLE: SEVERAL DAYS

## 2021-04-01 ASSESSMENT — PATIENT HEALTH QUESTIONNAIRE - PHQ9
5. POOR APPETITE OR OVEREATING: 3 - NEARLY EVERY DAY
CLINICAL INTERPRETATION OF PHQ2 SCORE: 6
SUM OF ALL RESPONSES TO PHQ QUESTIONS 1-9: 22
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
## 2021-04-19 DIAGNOSIS — R00.2 HEART PALPITATIONS: ICD-10-CM

## 2021-04-19 PROCEDURE — 93248 EXT ECG>7D<15D REV&INTERPJ: CPT | Performed by: INTERNAL MEDICINE

## 2021-05-05 ENCOUNTER — TELEMEDICINE (OUTPATIENT)
Dept: CARDIOLOGY | Facility: MEDICAL CENTER | Age: 36
End: 2021-05-05
Payer: OTHER GOVERNMENT

## 2021-05-05 VITALS
WEIGHT: 175 LBS | SYSTOLIC BLOOD PRESSURE: 126 MMHG | BODY MASS INDEX: 25.92 KG/M2 | DIASTOLIC BLOOD PRESSURE: 84 MMHG | HEIGHT: 69 IN | HEART RATE: 80 BPM

## 2021-05-05 DIAGNOSIS — I49.9 CARDIAC ARRHYTHMIA, UNSPECIFIED CARDIAC ARRHYTHMIA TYPE: ICD-10-CM

## 2021-05-05 DIAGNOSIS — R00.2 HEART PALPITATIONS: ICD-10-CM

## 2021-05-05 PROCEDURE — 99203 OFFICE O/P NEW LOW 30 MIN: CPT | Mod: GT,CR | Performed by: INTERNAL MEDICINE

## 2021-05-05 RX ORDER — VALACYCLOVIR HYDROCHLORIDE 500 MG/1
1000 TABLET, FILM COATED ORAL DAILY
COMMUNITY
Start: 2021-04-11 | End: 2021-05-13

## 2021-05-05 ASSESSMENT — FIBROSIS 4 INDEX: FIB4 SCORE: 0.73

## 2021-05-05 NOTE — PROGRESS NOTES
"Cardiology Telemedicine Visit: New Patient   This encounter was conducted via Zoom.   Verbal consent was obtained. Patient's identity was verified.    Assessment and Plan:   PCP: Adelso Ortiz M.D.  The following treatment plan was discussed:     1. Cardiac arrhythmia, unspecified cardiac arrhythmia type    2. Heart palpitations        Cameron Jeffries experienced a single episode of palpitations, most consistent with a panic attack, with a Holter monitor demonstrating a self terminating run of wide-complex, irregular tachycardia most likely benign and incidental SVT with aberrancy.  I recommended an echocardiogram to ensure the absence of structural heart disease.    Follow up: to be determined after testing is complete    Subjective:     Chief Complaint   Patient presents with   • Palpitations     History: Cameron Jeffries is a 35 y.o. male former  with family history of early stroke presenting for evaluation of palpitations.  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  He was transported to the hospital and evaluation was unrevealing.  A Zio patch showed symptoms of palpitations corresponded to sinus rhythm and an incidental note of a short run of wide-complex tachycardia which was irregular.  His symptoms have resolved.  He describes himself as a physically fit individual and experiences no cardiac symptoms during activity.      ROS   Denies any recent fevers or chills. No nausea or vomiting. No chest pains or shortness of breath. All other systems reviewed and negative except as per the HPI       Objective:   Vitals obtained by patient:  Respirations through observation: 12  /84 (BP Location: Right arm, Patient Position: Sitting, BP Cuff Size: Adult)   Pulse 80   Ht 1.753 m (5' 9\")   Wt 79.4 kg (175 lb)   BMI 25.84 kg/m²     Physical " Exam:  Constitutional: Alert, no distress, well-groomed.  Skin: No rashes in visible areas.  Eye: Round. Conjunctiva clear, lids normal. No icterus.   ENMT: Lips pink without lesions, good dentition, moist mucous membranes. Phonation normal.  Neck: No masses, no thyromegaly. Moves freely without pain.  CV: Pulse as reported by patient  Respiratory: Unlabored respiratory effort, no cough or audible wheeze  Psych: Alert and oriented x3, normal affect and mood.     The ASCVD Risk score (Limestone ALYSSA Jr, et al., 2013) failed to calculate.      No Known Allergies  Current medicines (including changes today)  Current Outpatient Medications   Medication Sig Dispense Refill   • valACYclovir (VALTREX) 500 MG Tab Take 500 mg by mouth. FOR 5 DAYS     • sertraline (ZOLOFT) 50 MG Tab Take 0.5 Tablets by mouth every day for 14 days, THEN 1 tablet every day for 30 days. 37 tablet 0     No current facility-administered medications for this visit.     Patient Active Problem List    Diagnosis Date Noted   • Heart palpitations 03/12/2021   • PTSD (post-traumatic stress disorder) 03/01/2021   • Generalized anxiety disorder 03/01/2021   • Severe episode of recurrent major depressive disorder, without psychotic features (HCC) 03/01/2021   • Herpes simplex infection of penis 03/01/2021     Family History   Problem Relation Age of Onset   • Stroke Father 45   • Hypertension Father    • Hyperlipidemia Father    • Psychiatric Illness Sister         depression   • Cancer Paternal Grandmother 60        breast    • Dementia Paternal Grandmother         alzheimers   • Alcohol abuse Paternal Grandfather      He  has a past medical history of Patient denies medical problems.  He  has no past surgical history on file.  Past Medical History:   Diagnosis Date   • Patient denies medical problems      No Known Allergies  Outpatient Encounter Medications as of 5/5/2021   Medication Sig Dispense Refill   • valACYclovir (VALTREX) 500 MG Tab Take 500 mg by  mouth. FOR 5 DAYS     • sertraline (ZOLOFT) 50 MG Tab Take 0.5 Tablets by mouth every day for 14 days, THEN 1 tablet every day for 30 days. 37 tablet 0     No facility-administered encounter medications on file as of 5/5/2021.     Social History     Socioeconomic History   • Marital status:      Spouse name: Not on file   • Number of children: Not on file   • Years of education: Not on file   • Highest education level: Not on file   Occupational History   • Not on file   Tobacco Use   • Smoking status: Former Smoker   • Smokeless tobacco: Former User   • Tobacco comment: smoked 5 cig /week 10 years, stop 2017, chew 3 cans a week, 18 years, stopped 2019   Substance and Sexual Activity   • Alcohol use: Not Currently     Comment: quit 2017.  8-9/day beer, 5-6/day whiskey   • Drug use: Never   • Sexual activity: Yes     Partners: Male   Other Topics Concern   • Not on file   Social History Narrative   • Not on file     Social Determinants of Health     Financial Resource Strain:    • Difficulty of Paying Living Expenses:    Food Insecurity:    • Worried About Running Out of Food in the Last Year:    • Ran Out of Food in the Last Year:    Transportation Needs:    • Lack of Transportation (Medical):    • Lack of Transportation (Non-Medical):    Physical Activity:    • Days of Exercise per Week:    • Minutes of Exercise per Session:    Stress:    • Feeling of Stress :    Social Connections:    • Frequency of Communication with Friends and Family:    • Frequency of Social Gatherings with Friends and Family:    • Attends Baptism Services:    • Active Member of Clubs or Organizations:    • Attends Club or Organization Meetings:    • Marital Status:    Intimate Partner Violence:    • Fear of Current or Ex-Partner:    • Emotionally Abused:    • Physically Abused:    • Sexually Abused:        Studies  Lab Results   Component Value Date/Time    CHOLSTRLTOT 130 03/04/2021 11:00 AM    LDL 78 03/04/2021 11:00 AM    HDL 41  03/04/2021 11:00 AM    TRIGLYCERIDE 53 03/04/2021 11:00 AM       Lab Results   Component Value Date/Time    SODIUM 141 03/04/2021 11:00 AM    POTASSIUM 4.5 03/04/2021 11:00 AM    CHLORIDE 107 03/04/2021 11:00 AM    CO2 25 03/04/2021 11:00 AM    GLUCOSE 92 03/04/2021 11:00 AM    BUN 13 03/04/2021 11:00 AM    CREATININE 1.03 03/04/2021 11:00 AM     Lab Results   Component Value Date/Time    ALKPHOSPHAT 47 02/25/2021 02:56 PM    ASTSGOT 22 02/25/2021 02:56 PM    ALTSGPT 24 02/25/2021 02:56 PM    TBILIRUBIN 0.5 02/25/2021 02:56 PM        For this encounter I reviewed the following medical records BMP, Lipid profile and Thyroid studies   For this encounter I directly reviewed ECG tracings and holter/event monitor tracings. I agree with the interpretations in the electronic health record.

## 2021-05-13 ENCOUNTER — TELEMEDICINE (OUTPATIENT)
Dept: BEHAVIORAL HEALTH | Facility: CLINIC | Age: 36
End: 2021-05-13
Payer: OTHER GOVERNMENT

## 2021-05-13 ENCOUNTER — OFFICE VISIT (OUTPATIENT)
Dept: MEDICAL GROUP | Facility: MEDICAL CENTER | Age: 36
End: 2021-05-13
Payer: OTHER GOVERNMENT

## 2021-05-13 VITALS
HEART RATE: 96 BPM | OXYGEN SATURATION: 96 % | DIASTOLIC BLOOD PRESSURE: 76 MMHG | SYSTOLIC BLOOD PRESSURE: 106 MMHG | WEIGHT: 178.57 LBS | HEIGHT: 70 IN | BODY MASS INDEX: 25.56 KG/M2 | TEMPERATURE: 97.3 F | RESPIRATION RATE: 16 BRPM

## 2021-05-13 VITALS — WEIGHT: 178 LBS | HEIGHT: 69 IN | BODY MASS INDEX: 26.36 KG/M2

## 2021-05-13 DIAGNOSIS — Z13.6 SCREENING FOR CARDIOVASCULAR CONDITION: ICD-10-CM

## 2021-05-13 DIAGNOSIS — F43.10 PTSD (POST-TRAUMATIC STRESS DISORDER): ICD-10-CM

## 2021-05-13 DIAGNOSIS — Z23 NEED FOR VACCINATION: ICD-10-CM

## 2021-05-13 DIAGNOSIS — F33.42 RECURRENT MAJOR DEPRESSIVE DISORDER, IN FULL REMISSION (HCC): ICD-10-CM

## 2021-05-13 DIAGNOSIS — F41.1 GENERALIZED ANXIETY DISORDER: ICD-10-CM

## 2021-05-13 DIAGNOSIS — A60.01 HERPES SIMPLEX INFECTION OF PENIS: ICD-10-CM

## 2021-05-13 DIAGNOSIS — Z13.1 DIABETES MELLITUS SCREENING: ICD-10-CM

## 2021-05-13 DIAGNOSIS — F33.2 SEVERE EPISODE OF RECURRENT MAJOR DEPRESSIVE DISORDER, WITHOUT PSYCHOTIC FEATURES (HCC): ICD-10-CM

## 2021-05-13 DIAGNOSIS — F33.41 RECURRENT MAJOR DEPRESSIVE DISORDER, IN PARTIAL REMISSION (HCC): ICD-10-CM

## 2021-05-13 DIAGNOSIS — Z00.00 ENCOUNTER FOR PREVENTIVE CARE: ICD-10-CM

## 2021-05-13 PROCEDURE — 99214 OFFICE O/P EST MOD 30 MIN: CPT | Mod: 95 | Performed by: PSYCHIATRY & NEUROLOGY

## 2021-05-13 PROCEDURE — 99214 OFFICE O/P EST MOD 30 MIN: CPT | Mod: 25 | Performed by: INTERNAL MEDICINE

## 2021-05-13 PROCEDURE — 90714 TD VACC NO PRESV 7 YRS+ IM: CPT | Performed by: INTERNAL MEDICINE

## 2021-05-13 PROCEDURE — 90471 IMMUNIZATION ADMIN: CPT | Performed by: INTERNAL MEDICINE

## 2021-05-13 PROCEDURE — 96127 BRIEF EMOTIONAL/BEHAV ASSMT: CPT | Mod: 95 | Performed by: PSYCHIATRY & NEUROLOGY

## 2021-05-13 RX ORDER — VALACYCLOVIR HYDROCHLORIDE 500 MG/1
500 TABLET, FILM COATED ORAL 2 TIMES DAILY
Qty: 6 TABLET | Refills: 3 | Status: SHIPPED | OUTPATIENT
Start: 2021-05-13 | End: 2021-05-16

## 2021-05-13 ASSESSMENT — PATIENT HEALTH QUESTIONNAIRE - PHQ9
SUM OF ALL RESPONSES TO PHQ QUESTIONS 1-9: 6
CLINICAL INTERPRETATION OF PHQ2 SCORE: 2
5. POOR APPETITE OR OVEREATING: 0 - NOT AT ALL

## 2021-05-13 ASSESSMENT — FIBROSIS 4 INDEX
FIB4 SCORE: 0.73
FIB4 SCORE: 0.73

## 2021-05-13 NOTE — ASSESSMENT & PLAN NOTE
Hx of recurrent depression, denies SI/HI  Now controlled with Zoloft  Denies SI/HI    Follow up with psychiatry

## 2021-05-13 NOTE — PROGRESS NOTES
Established Patient    Cameron Jeffries is a 35 y.o. male who presents today with the following:    CC:   Chief Complaint   Patient presents with   • Lab Results   • Follow-Up     Anxiety       HPI:     Generalized anxiety disorder  Hx of anxiety attacks  Controlled on zoloft  Following with psychiatry and psychology      Recurrent major depressive disorder, in full remission (HCC)  Hx of recurrent depression, denies SI/HI  Now controlled with Zoloft  Denies SI/HI    Follow up with psychiatry       PTSD (post-traumatic stress disorder)  Hx of PTSD from [place] while in [employer].  Controlled   Follow up with psychiatry      Herpes simplex infection of penis  Hx of genital herpes of penis  Twice a year  Valacyclovir 500 mg BID for three days during flare up        Current Outpatient Medications   Medication Sig Dispense Refill   • valACYclovir (VALTREX) 500 MG Tab Take 1 tablet by mouth 2 times a day for 3 days. 6 tablet 3   • sertraline (ZOLOFT) 50 MG Tab Take 0.5 Tablets by mouth every day for 14 days, THEN 1 tablet every day for 30 days. 37 tablet 0     No current facility-administered medications for this visit.       Allergies, past medical history, past surgical history, medications, family history, social history reviewed and updated.    ROS   Constitutional: Denies fevers or chills  Eyes: Denies changes in vision  Ears/Nose/Throat/Mouth: Denies nasal congestion or sore throat   Cardiovascular: Denies chest pain or palpitations   Respiratory: Denies shortness of breath , Denies cough  Gastrointestinal/Hepatic: Denies abd pain, nausea, vomiting   Genitourinary: Denies dysuria or frequency  Musculoskeletal/Rheum: Denies joint pain and swelling   Neurological: Denies headache  Psychiatric: anxiety  Endocrine: Denies hx of diabetes or thyroid dysfunction  Heme/Oncology/Lymph Nodes: Denies weight changes or enlarged LNs.    Physical Exam  Vitals: /76 (BP Location: Left arm, Patient Position: Sitting, BP Cuff  "Size: Adult)   Pulse 96   Temp 36.3 °C (97.3 °F)   Resp 16   Ht 1.77 m (5' 9.69\")   Wt 81 kg (178 lb 9.2 oz)   SpO2 96%   BMI 25.85 kg/m²   General: Alert, pleasant, NAD  HEENT: Normocephalic.  EOMI, no icterus or pallor.  Conjunctivae and lids normal. External ears normal. Wearing a mask. Oropharynx non-erythematous, mucous membranes moist.  Neck supple.  No thyromegaly or masses palpated.   Lymph: No cervical or supraclavicular lymphadenopathy.  Cardiovascular: Regular rate and rhythm.  S1 and S2 normal.  No murmurs appreciated.  Respiratory: Normal respiratory effort.  Clear to auscultation bilaterally.  Abdomen: Non-distended, soft, non-tender  Skin: Warm, dry, no rashes.  Musculoskeletal: Gait is normal.  Moves all extremities well.  Extremities: No leg edema.  Radial pulses 2+ symmetric.   Psych:  Affect/mood is normal, judgement is good, memory is intact, grooming is appropriate.      Assessment and Plan    1. Need for vaccination  - TD Preservative Free =>8yo IM    2. Herpes simplex infection of penis  - valACYclovir (VALTREX) 500 MG Tab; Take 1 tablet by mouth 2 times a day for 3 days.  Dispense: 6 tablet; Refill: 3    3. Screening for cardiovascular condition  - Lipid Profile; Future    4. Generalized anxiety disorder  5. Recurrent major depressive disorder, in full remission (HCC)  - TSH WITH REFLEX TO FT4; Future  Follow up with psychiatry      6. Diabetes mellitus screening  - Comp Metabolic Panel; Future    7. Encounter for preventive care  - CBC WITH DIFFERENTIAL; Future    8. PTSD (post-traumatic stress disorder)  Follow up with psychiatry      Follow-up:Return in about 1 year (around 5/13/2022), or if symptoms worsen or fail to improve.    This note was created using voice recognition software. There may be unintended errors in spelling, grammar or content.    "

## 2021-05-13 NOTE — ASSESSMENT & PLAN NOTE
Hx of genital herpes of penis  Twice a year  Valacyclovir 500 mg BID for three days during flare up
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
## 2021-07-10 DIAGNOSIS — F43.10 PTSD (POST-TRAUMATIC STRESS DISORDER): ICD-10-CM

## 2021-07-10 DIAGNOSIS — F41.1 GENERALIZED ANXIETY DISORDER: ICD-10-CM

## 2021-07-10 DIAGNOSIS — F33.41 RECURRENT MAJOR DEPRESSIVE DISORDER, IN PARTIAL REMISSION (HCC): ICD-10-CM

## 2021-09-17 DIAGNOSIS — F41.1 GENERALIZED ANXIETY DISORDER: ICD-10-CM

## 2021-09-17 DIAGNOSIS — F43.10 PTSD (POST-TRAUMATIC STRESS DISORDER): ICD-10-CM

## 2021-09-17 DIAGNOSIS — F33.41 RECURRENT MAJOR DEPRESSIVE DISORDER, IN PARTIAL REMISSION (HCC): ICD-10-CM

## 2021-10-12 ENCOUNTER — OFFICE VISIT (OUTPATIENT)
Dept: URGENT CARE | Facility: CLINIC | Age: 36
End: 2021-10-12
Payer: OTHER GOVERNMENT

## 2021-10-12 ENCOUNTER — HOSPITAL ENCOUNTER (OUTPATIENT)
Facility: MEDICAL CENTER | Age: 36
End: 2021-10-12
Attending: STUDENT IN AN ORGANIZED HEALTH CARE EDUCATION/TRAINING PROGRAM
Payer: OTHER GOVERNMENT

## 2021-10-12 VITALS
DIASTOLIC BLOOD PRESSURE: 74 MMHG | SYSTOLIC BLOOD PRESSURE: 128 MMHG | BODY MASS INDEX: 24.05 KG/M2 | OXYGEN SATURATION: 98 % | TEMPERATURE: 97.4 F | RESPIRATION RATE: 20 BRPM | WEIGHT: 168 LBS | HEART RATE: 78 BPM | HEIGHT: 70 IN

## 2021-10-12 DIAGNOSIS — F33.41 RECURRENT MAJOR DEPRESSIVE DISORDER, IN PARTIAL REMISSION (HCC): ICD-10-CM

## 2021-10-12 DIAGNOSIS — Z20.2 POSSIBLE EXPOSURE TO STD: ICD-10-CM

## 2021-10-12 DIAGNOSIS — L73.9 FOLLICULITIS: ICD-10-CM

## 2021-10-12 DIAGNOSIS — A60.01 HERPES SIMPLEX INFECTION OF PENIS: ICD-10-CM

## 2021-10-12 DIAGNOSIS — K30 INDIGESTION: ICD-10-CM

## 2021-10-12 LAB
APPEARANCE UR: CLEAR
BILIRUB UR STRIP-MCNC: NORMAL MG/DL
COLOR UR AUTO: YELLOW
GLUCOSE UR STRIP.AUTO-MCNC: NORMAL MG/DL
KETONES UR STRIP.AUTO-MCNC: NORMAL MG/DL
LEUKOCYTE ESTERASE UR QL STRIP.AUTO: NORMAL
NITRITE UR QL STRIP.AUTO: NORMAL
PH UR STRIP.AUTO: 5 [PH] (ref 5–8)
PROT UR QL STRIP: NORMAL MG/DL
RBC UR QL AUTO: NORMAL
SP GR UR STRIP.AUTO: 1.02
UROBILINOGEN UR STRIP-MCNC: 0.2 MG/DL

## 2021-10-12 PROCEDURE — 99215 OFFICE O/P EST HI 40 MIN: CPT | Performed by: STUDENT IN AN ORGANIZED HEALTH CARE EDUCATION/TRAINING PROGRAM

## 2021-10-12 PROCEDURE — 87491 CHLMYD TRACH DNA AMP PROBE: CPT

## 2021-10-12 PROCEDURE — 87591 N.GONORRHOEAE DNA AMP PROB: CPT

## 2021-10-12 PROCEDURE — 81002 URINALYSIS NONAUTO W/O SCOPE: CPT | Performed by: STUDENT IN AN ORGANIZED HEALTH CARE EDUCATION/TRAINING PROGRAM

## 2021-10-12 RX ORDER — PANTOPRAZOLE SODIUM 40 MG/1
40 TABLET, DELAYED RELEASE ORAL DAILY
Qty: 30 TABLET | Refills: 0 | Status: SHIPPED | OUTPATIENT
Start: 2021-10-12 | End: 2022-01-14

## 2021-10-12 RX ORDER — HYDROXYZINE HYDROCHLORIDE 25 MG/1
25 TABLET, FILM COATED ORAL 3 TIMES DAILY PRN
Qty: 30 TABLET | Refills: 0 | Status: SHIPPED | OUTPATIENT
Start: 2021-10-12 | End: 2021-12-09

## 2021-10-12 RX ORDER — VALACYCLOVIR HYDROCHLORIDE 500 MG/1
TABLET, FILM COATED ORAL
Qty: 6 TABLET | Refills: 1 | Status: SHIPPED | OUTPATIENT
Start: 2021-10-12

## 2021-10-12 RX ORDER — VALACYCLOVIR HYDROCHLORIDE 500 MG/1
500 TABLET, FILM COATED ORAL
COMMUNITY
Start: 2021-09-19 | End: 2021-11-11

## 2021-10-12 ASSESSMENT — FIBROSIS 4 INDEX: FIB4 SCORE: 0.75

## 2021-10-12 NOTE — PROGRESS NOTES
Subjective:   CHIEF COMPLAINT  Chief Complaint   Patient presents with   • Other     patient believes they're stomach ulcers x 2 months   • Other     requesting antidepressants   • Bump     groing bumps x 1-2 week       HPI  Cameron Jeffries is a 36 y.o. male who presents with multiple concerns.  First the patient is concern for STIs.  Says his wife recently cheated on him.  Now the patient has noticed a rash in his pubic area.  Rash does not itch or painful.  Patient does report a previous history of HSV but says the current rash is different from previous HSV outbreaks.  Says he gets 1-2 HSV outbreaks a year.  He is not experiencing dysuria, hematuria or discharge from his penis.  Patient is requesting a refill of Valtrex    With regards to the infidelity of his wife the patient is understandably experiencing depression.  Says he is having a hard time sleeping at night, difficulty with motivation and getting work done during the day.  He does follow with behavioral health and says he was on sertraline in the past but did not say whether or not he was currently on the medication.  Patient is requesting to start a new medication.  No SI/HI    Lastly the patient complains of epigastric indigestion.  Says symptoms started approximately 6 months ago.  Symptoms are aggravated with an empty stomach and seem to improve with food.  The patient has tried an unspecified over-the-counter antacid which seemed to help.  Patient does not use NSAIDs.  Prior history of tobacco use.  Currently uses nicotine patches.  No nausea or vomiting.    REVIEW OF SYSTEMS  General: no fever or chills  GI: no nausea or vomiting  See HPI for further details.    PAST MEDICAL HISTORY  Patient Active Problem List    Diagnosis Date Noted   • Heart palpitations 03/12/2021   • PTSD (post-traumatic stress disorder) 03/01/2021   • Generalized anxiety disorder 03/01/2021   • Recurrent major depressive disorder, in partial remission (HCC) 03/01/2021   •  "Herpes simplex infection of penis 03/01/2021       SURGICAL HISTORY  patient denies any surgical history    ALLERGIES  No Known Allergies    CURRENT MEDICATIONS  Home Medications     Reviewed by Nabor Chong D.O. (Physician) on 10/12/21 at 1228  Med List Status: <None>   Medication Last Dose Status   sertraline (ZOLOFT) 50 MG Tab Not Taking Active   valACYclovir (VALTREX) 500 MG Tab Not Taking Active                SOCIAL HISTORY  Social History     Tobacco Use   • Smoking status: Former Smoker   • Smokeless tobacco: Former User   • Tobacco comment: smoked 5 cig /week 10 years, stop 2017, chew 3 cans a week, 18 years, stopped 2019   Vaping Use   • Vaping Use: Never used   Substance and Sexual Activity   • Alcohol use: Not Currently     Comment: quit 2017.  8-9/day beer, 5-6/day whiskey   • Drug use: Never   • Sexual activity: Yes     Partners: Male       FAMILY HISTORY  Family History   Problem Relation Age of Onset   • Stroke Father 45   • Hypertension Father    • Hyperlipidemia Father    • Psychiatric Illness Sister         depression   • Cancer Paternal Grandmother 60        breast    • Dementia Paternal Grandmother         alzheimers   • Alcohol abuse Paternal Grandfather           Objective:   PHYSICAL EXAM  VITAL SIGNS: /74 (BP Location: Left arm, Patient Position: Sitting, BP Cuff Size: Adult)   Pulse 78   Temp 36.3 °C (97.4 °F) (Temporal)   Resp 20   Ht 1.778 m (5' 10\")   Wt 76.2 kg (168 lb)   SpO2 98%   BMI 24.11 kg/m²     Gen: no acute distress, normal voice  Skin: Very mild pubic folliculitis  Lungs: CTAB w/ symmetric expansion  CV: RRR w/o murmurs or clicks  Abdomen: Bowel sounds normal, soft, no tenderness, rebound or guarding.   Psych: normal affect, normal judgement, alert, awake    Assessment/Plan:     1. Possible exposure to STD  POCT Urinalysis    Chlamydia/GC PCR Urine Or Swab   2. Folliculitis     3. Recurrent major depressive disorder, in partial remission (HCC)  hydrOXYzine " HCl (ATARAX) 25 MG Tab   4. Herpes simplex infection of penis  valACYclovir (VALTREX) 500 MG Tab   5. Indigestion  pantoprazole (PROTONIX) 40 MG Tablet Delayed Response   1) patient is asymptomatic but wife had an affair.  Patient is requesting a test for STIs.  -Ordered GC/CT    2) examination consistent with acute folliculitis due to using a razor/clippers.  Provided reassurance to the patient.  Symptoms should be self-limiting.  Okay to try over-the-counter topical hydrocortisone.    3) chronic issue with an acute exacerbation secondary to above.  Advised the patient that I cannot start any new antipsychotic medication through urgent care, specifically the current concerns of these medications can increase risk of SI.  Patient denies experiencing any SI or HI.  We will do a trial of hydroxyzine.  Fortunately the patient is established with psychiatry  -Ordered prescription for hydroxyzine as needed  -Encourage patient follow-up with psychiatry    4) no acute outbreak but patient is requesting a refill of his medication.  -Ordered refill Valtrex    5) patient is tried an over-the-counter antacid which is helped in the past.  He is not use any NSAIDs.  -Ordered prescription for pantoprazole  -Instructed the patient to follow-up with primary care for continued management    Differential diagnosis, natural history, supportive care, and indications for immediate follow-up discussed. All questions answered. Patient agrees with the plan of care.    Follow-up as needed if symptoms worsen or fail to improve to PCP, Urgent care or Emergency Room.    51 minutes was spent on this encounter including face-to-face time, discussing the diagnosis, medical management, follow-up, emergency room precautions and charting. This does not include time spent on separately billable procedures/tests.    Please note that this dictation was created using voice recognition software. I have made a reasonable attempt to correct obvious errors,  but I expect that there are errors of grammar and possibly content that I did not discover before finalizing the note.

## 2021-10-13 LAB
C TRACH DNA SPEC QL NAA+PROBE: NEGATIVE
N GONORRHOEA DNA SPEC QL NAA+PROBE: NEGATIVE
SPECIMEN SOURCE: NORMAL

## 2021-11-11 ENCOUNTER — OFFICE VISIT (OUTPATIENT)
Dept: BEHAVIORAL HEALTH | Facility: CLINIC | Age: 36
End: 2021-11-11
Payer: OTHER GOVERNMENT

## 2021-11-11 DIAGNOSIS — G47.09 OTHER INSOMNIA: ICD-10-CM

## 2021-11-11 DIAGNOSIS — F41.0 PANIC ATTACKS: ICD-10-CM

## 2021-11-11 DIAGNOSIS — F41.1 GENERALIZED ANXIETY DISORDER: ICD-10-CM

## 2021-11-11 PROCEDURE — 90833 PSYTX W PT W E/M 30 MIN: CPT | Performed by: PSYCHIATRY & NEUROLOGY

## 2021-11-11 PROCEDURE — 99214 OFFICE O/P EST MOD 30 MIN: CPT | Performed by: PSYCHIATRY & NEUROLOGY

## 2021-11-11 RX ORDER — TRAZODONE HYDROCHLORIDE 50 MG/1
25 TABLET ORAL NIGHTLY PRN
Qty: 30 TABLET | Refills: 1 | Status: SHIPPED | OUTPATIENT
Start: 2021-11-11 | End: 2021-12-09 | Stop reason: SDUPTHER

## 2021-11-11 RX ORDER — LORAZEPAM 0.5 MG/1
0.5 TABLET ORAL
Qty: 15 TABLET | Refills: 0 | Status: SHIPPED | OUTPATIENT
Start: 2021-11-11 | End: 2021-12-09 | Stop reason: SDUPTHER
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
## 2021-12-09 ENCOUNTER — OFFICE VISIT (OUTPATIENT)
Dept: BEHAVIORAL HEALTH | Facility: CLINIC | Age: 36
End: 2021-12-09
Payer: OTHER GOVERNMENT

## 2021-12-09 DIAGNOSIS — F43.10 PTSD (POST-TRAUMATIC STRESS DISORDER): ICD-10-CM

## 2021-12-09 DIAGNOSIS — G47.09 OTHER INSOMNIA: ICD-10-CM

## 2021-12-09 DIAGNOSIS — F41.1 GENERALIZED ANXIETY DISORDER: ICD-10-CM

## 2021-12-09 DIAGNOSIS — F41.0 PANIC ATTACKS: ICD-10-CM

## 2021-12-09 DIAGNOSIS — F33.9 EPISODE OF RECURRENT MAJOR DEPRESSIVE DISORDER, UNSPECIFIED DEPRESSION EPISODE SEVERITY (HCC): ICD-10-CM

## 2021-12-09 PROCEDURE — 90791 PSYCH DIAGNOSTIC EVALUATION: CPT | Performed by: SOCIAL WORKER

## 2021-12-09 PROCEDURE — 99214 OFFICE O/P EST MOD 30 MIN: CPT | Performed by: PSYCHIATRY & NEUROLOGY

## 2021-12-09 RX ORDER — TRAZODONE HYDROCHLORIDE 50 MG/1
25 TABLET ORAL NIGHTLY PRN
Qty: 30 TABLET | Refills: 1 | Status: SHIPPED | OUTPATIENT
Start: 2021-12-09 | End: 2021-12-29

## 2021-12-09 RX ORDER — LORAZEPAM 0.5 MG/1
0.5 TABLET ORAL
Qty: 15 TABLET | Refills: 0 | Status: SHIPPED | OUTPATIENT
Start: 2021-12-09 | End: 2022-01-08

## 2021-12-09 ASSESSMENT — ANXIETY QUESTIONNAIRES
7. FEELING AFRAID AS IF SOMETHING AWFUL MIGHT HAPPEN: NEARLY EVERY DAY
4. TROUBLE RELAXING: NEARLY EVERY DAY
GAD7 TOTAL SCORE: 20
5. BEING SO RESTLESS THAT IT IS HARD TO SIT STILL: MORE THAN HALF THE DAYS
2. NOT BEING ABLE TO STOP OR CONTROL WORRYING: NEARLY EVERY DAY
1. FEELING NERVOUS, ANXIOUS, OR ON EDGE: NEARLY EVERY DAY
3. WORRYING TOO MUCH ABOUT DIFFERENT THINGS: NEARLY EVERY DAY
6. BECOMING EASILY ANNOYED OR IRRITABLE: NEARLY EVERY DAY

## 2021-12-09 ASSESSMENT — PATIENT HEALTH QUESTIONNAIRE - PHQ9
SUM OF ALL RESPONSES TO PHQ QUESTIONS 1-9: 23
CLINICAL INTERPRETATION OF PHQ2 SCORE: 5
5. POOR APPETITE OR OVEREATING: 3 - NEARLY EVERY DAY
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
## 2021-12-16 ENCOUNTER — APPOINTMENT (RX ONLY)
Dept: URBAN - METROPOLITAN AREA CLINIC 6 | Facility: CLINIC | Age: 36
Setting detail: DERMATOLOGY
End: 2021-12-16

## 2021-12-16 DIAGNOSIS — B00.1 HERPESVIRAL VESICULAR DERMATITIS: ICD-10-CM

## 2021-12-16 DIAGNOSIS — B08.1 MOLLUSCUM CONTAGIOSUM: ICD-10-CM

## 2021-12-16 PROCEDURE — 99203 OFFICE O/P NEW LOW 30 MIN: CPT | Mod: 25

## 2021-12-16 PROCEDURE — ? COUNSELING

## 2021-12-16 PROCEDURE — ? PRESCRIPTION

## 2021-12-16 PROCEDURE — ? LIQUID NITROGEN

## 2021-12-16 PROCEDURE — 17111 DESTRUCTION B9 LESIONS 15/>: CPT

## 2021-12-16 PROCEDURE — ? ADDITIONAL NOTES

## 2021-12-16 RX ORDER — VALACYCLOVIR 1 G/1
TABLET, FILM COATED ORAL
Qty: 5 | Refills: 6 | Status: ERX | COMMUNITY
Start: 2021-12-16

## 2021-12-16 RX ADMIN — VALACYCLOVIR: 1 TABLET, FILM COATED ORAL at 00:00

## 2021-12-16 ASSESSMENT — LOCATION DETAILED DESCRIPTION DERM
LOCATION DETAILED: RIGHT SUPRAPUBIC REGION
LOCATION DETAILED: LEFT SUPRAPUBIC REGION
LOCATION DETAILED: LEFT LATERAL DISTAL DORSAL PENILE SHAFT
LOCATION DETAILED: INFRA-UMBILICAL REGION
LOCATION DETAILED: RIGHT INGUINAL CREASE
LOCATION DETAILED: RIGHT SUPERIOR MEDIAL SCROTUM
LOCATION DETAILED: RIGHT LATERAL BASE OF VENTRAL PENILE SHAFT
LOCATION DETAILED: RIGHT SUPERIOR LATERAL SCROTUM
LOCATION DETAILED: RIGHT INFERIOR LATERAL SCROTUM
LOCATION DETAILED: LEFT ANTERIOR PROXIMAL THIGH
LOCATION DETAILED: LEFT LATERAL BASE OF VENTRAL PENILE SHAFT
LOCATION DETAILED: RIGHT ANTERIOR PROXIMAL THIGH
LOCATION DETAILED: RIGHT PENIS BASE
LOCATION DETAILED: RIGHT LATERAL MID-DORSAL PENILE SHAFT

## 2021-12-16 ASSESSMENT — LOCATION SIMPLE DESCRIPTION DERM
LOCATION SIMPLE: VENTRAL PENIS
LOCATION SIMPLE: LEFT SUPRAPUBIC REGION
LOCATION SIMPLE: LEFT THIGH
LOCATION SIMPLE: RIGHT INGUINAL CREASE
LOCATION SIMPLE: SUPRAPUBIC REGION
LOCATION SIMPLE: RIGHT PENIS
LOCATION SIMPLE: LEFT PENIS
LOCATION SIMPLE: RIGHT SCROTUM
LOCATION SIMPLE: DORSAL PENIS
LOCATION SIMPLE: RIGHT THIGH
LOCATION SIMPLE: RIGHT SUPRAPUBIC REGION

## 2021-12-16 ASSESSMENT — LOCATION ZONE DERM
LOCATION ZONE: PENIS
LOCATION ZONE: LEG
LOCATION ZONE: SCROTUM
LOCATION ZONE: TRUNK

## 2021-12-16 NOTE — HPI: BUMPS
How Severe Are Your Bumps?: mild
Have Your Bumps Been Treated?: not been treated
Is This A New Presentation, Or A Follow-Up?: Bumps
Additional History: Patient states he and his wife have recently  and suspects she may have had additional sexual partners in recent months.

## 2021-12-16 NOTE — PROCEDURE: LIQUID NITROGEN
Detail Level: Detailed
Consent: The patient's consent was obtained including but not limited to risks of crusting, scabbing, blistering, scarring, darker or lighter pigmentary change, recurrence, incomplete removal and infection.
Medical Necessity Information: It is in your best interest to select a reason for this procedure from the list below. All of these items fulfill various CMS LCD requirements except the new and changing color options.
Add 52 Modifier (Optional): no
Show Applicator Variable?: Yes
Post-Care Instructions: I reviewed with the patient in detail post-care instructions. Patient is to wear sunprotection, and avoid picking at any of the treated lesions. Pt may apply Vaseline to crusted or scabbing areas.
Medical Necessity Clause: This procedure was medically necessary because the lesions that were treated were:

## 2021-12-23 ENCOUNTER — APPOINTMENT (OUTPATIENT)
Dept: BEHAVIORAL HEALTH | Facility: CLINIC | Age: 36
End: 2021-12-23
Payer: OTHER GOVERNMENT

## 2021-12-29 DIAGNOSIS — G47.09 OTHER INSOMNIA: ICD-10-CM

## 2021-12-29 RX ORDER — TRAZODONE HYDROCHLORIDE 50 MG/1
TABLET ORAL
Qty: 30 TABLET | Refills: 1 | Status: SHIPPED | OUTPATIENT
Start: 2021-12-29 | End: 2022-01-27 | Stop reason: SDUPTHER

## 2022-01-12 ENCOUNTER — APPOINTMENT (RX ONLY)
Dept: URBAN - METROPOLITAN AREA CLINIC 6 | Facility: CLINIC | Age: 37
Setting detail: DERMATOLOGY
End: 2022-01-12

## 2022-01-12 DIAGNOSIS — B08.1 MOLLUSCUM CONTAGIOSUM: ICD-10-CM

## 2022-01-12 PROCEDURE — 17111 DESTRUCTION B9 LESIONS 15/>: CPT

## 2022-01-12 PROCEDURE — ? COUNSELING

## 2022-01-12 PROCEDURE — ? LIQUID NITROGEN

## 2022-01-12 ASSESSMENT — LOCATION SIMPLE DESCRIPTION DERM
LOCATION SIMPLE: GROIN
LOCATION SIMPLE: SCROTUM
LOCATION SIMPLE: RIGHT THIGH
LOCATION SIMPLE: ABDOMEN
LOCATION SIMPLE: PENIS
LOCATION SIMPLE: DORSAL PENIS
LOCATION SIMPLE: LEFT THIGH

## 2022-01-12 ASSESSMENT — LOCATION DETAILED DESCRIPTION DERM
LOCATION DETAILED: RIGHT LATERAL MID-DORSAL PENILE SHAFT
LOCATION DETAILED: LEFT ANTERIOR PROXIMAL THIGH
LOCATION DETAILED: BASE OF THE PENIS
LOCATION DETAILED: RIGHT ANTERIOR SCROTUM
LOCATION DETAILED: RIGHT ANTERIOR PROXIMAL THIGH
LOCATION DETAILED: SUPRAPUBIC SKIN
LOCATION DETAILED: PERIUMBILICAL SKIN
LOCATION DETAILED: LEFT ANTERIOR SCROTUM

## 2022-01-12 ASSESSMENT — LOCATION ZONE DERM
LOCATION ZONE: TRUNK
LOCATION ZONE: PENIS
LOCATION ZONE: LEG
LOCATION ZONE: GENITALIA

## 2022-01-12 NOTE — PROCEDURE: LIQUID NITROGEN
Show Spray Paint Technique Variable?: Yes
Include Z78.9 (Other Specified Conditions Influencing Health Status) As An Associated Diagnosis?: No
Post-Care Instructions: I reviewed with the patient in detail post-care instructions. Patient is to wear sunprotection, and avoid picking at any of the treated lesions. Pt may apply Vaseline to crusted or scabbing areas.
Detail Level: Detailed
Medical Necessity Information: It is in your best interest to select a reason for this procedure from the list below. All of these items fulfill various CMS LCD requirements except the new and changing color options.
Consent: The patient's consent was obtained including but not limited to risks of crusting, scabbing, blistering, scarring, darker or lighter pigmentary change, recurrence, incomplete removal and infection.
Medical Necessity Clause: This procedure was medically necessary because the lesions that were treated were:
Spray Paint Text: The liquid nitrogen was applied to the skin utilizing a spray paint frosting technique.

## 2022-01-13 ENCOUNTER — APPOINTMENT (OUTPATIENT)
Dept: MEDICAL GROUP | Facility: MEDICAL CENTER | Age: 37
End: 2022-01-13
Payer: OTHER GOVERNMENT

## 2022-01-14 ENCOUNTER — OFFICE VISIT (OUTPATIENT)
Dept: MEDICAL GROUP | Facility: MEDICAL CENTER | Age: 37
End: 2022-01-14
Payer: OTHER GOVERNMENT

## 2022-01-14 VITALS
SYSTOLIC BLOOD PRESSURE: 147 MMHG | BODY MASS INDEX: 25.14 KG/M2 | HEIGHT: 69 IN | DIASTOLIC BLOOD PRESSURE: 95 MMHG | HEART RATE: 92 BPM | TEMPERATURE: 98.3 F | RESPIRATION RATE: 14 BRPM | WEIGHT: 169.75 LBS | OXYGEN SATURATION: 99 %

## 2022-01-14 DIAGNOSIS — K30 INDIGESTION: ICD-10-CM

## 2022-01-14 DIAGNOSIS — K21.9 GASTROESOPHAGEAL REFLUX DISEASE WITHOUT ESOPHAGITIS: ICD-10-CM

## 2022-01-14 PROCEDURE — 99213 OFFICE O/P EST LOW 20 MIN: CPT | Performed by: INTERNAL MEDICINE

## 2022-01-14 RX ORDER — VALACYCLOVIR HYDROCHLORIDE 1 G/1
TABLET, FILM COATED ORAL
COMMUNITY
Start: 2021-12-16

## 2022-01-14 ASSESSMENT — PATIENT HEALTH QUESTIONNAIRE - PHQ9
CLINICAL INTERPRETATION OF PHQ2 SCORE: 4
5. POOR APPETITE OR OVEREATING: 2 - MORE THAN HALF THE DAYS
SUM OF ALL RESPONSES TO PHQ QUESTIONS 1-9: 15

## 2022-01-14 ASSESSMENT — FIBROSIS 4 INDEX: FIB4 SCORE: 0.75

## 2022-01-15 NOTE — ASSESSMENT & PLAN NOTE
Heartburn, indigestion, nausea for six month  Denies dysphagia   Omeprazole 20 mg daily help, however, when he stops his symptoms return

## 2022-01-15 NOTE — PROGRESS NOTES
Established Patient    Cameron Jeffries is a 36 y.o. male who presents today with the following:    CC:   Chief Complaint   Patient presents with   • GI Problem   • Referral Needed     GI       HPI:       Problem   Gastroesophageal Reflux Disease Without Esophagitis    Heartburn, indigestion, nausea for six month  Denies dysphagia   Omeprazole 20 mg daily help, however, when he stops his symptoms return     Indigestion    Indigestion, he gets epigastric discomfort when empty stomach          Current Outpatient Medications   Medication Sig Dispense Refill   • valacyclovir (VALTREX) 1 GM Tab TAKE 1 TABLET BY MOUTH DAILY FOR 5 DAYS AS NEEDED FOR FLARES     • traZODone (DESYREL) 50 MG Tab TAKE 1/2 TO 1 TABLET BY MOUTH AT BEDTIME AS NEEDED FOR SLEEP 30 Tablet 1   • sertraline (ZOLOFT) 50 MG Tab Take 0.5 Tablets by mouth every day for 7 days, THEN 1 Tablet every day for 14 days, THEN 2 Tablets every day for 30 days. 78 Tablet 0   • valACYclovir (VALTREX) 500 MG Tab Take 1 tablet twice daily for 3 days at initial onset of symptoms. (Patient not taking: Reported on 1/14/2022) 6 Tablet 1     No current facility-administered medications for this visit.       Allergies, past medical history, past surgical history, medications, family history, social history reviewed and updated.    ROS   Constitutional: Denies fevers or chills  Eyes: Denies changes in vision  Ears/Nose/Throat/Mouth: Denies nasal congestion or sore throat   Cardiovascular: Denies chest pain or palpitations   Respiratory: Denies shortness of breath , Denies cough  Gastrointestinal/Hepatic: per HPI   Genitourinary: Denies dysuria or frequency  Musculoskeletal/Rheum: Denies joint pain and swelling   Neurological: Denies headache  Psychiatric: Denies mood disorder   Endocrine: Denies hx of diabetes or thyroid dysfunction  Heme/Oncology/Lymph Nodes: Denies weight changes or enlarged LNs.    Physical Exam  Vitals: /95 (BP Location: Left arm, Patient Position:  "Sitting, BP Cuff Size: Adult)   Pulse 92   Temp 36.8 °C (98.3 °F) (Temporal)   Resp 14   Ht 1.753 m (5' 9\")   Wt 77 kg (169 lb 12.1 oz)   SpO2 99%   BMI 25.07 kg/m²   General: Alert, pleasant, NAD  HEENT: Normocephalic.  EOMI, no icterus or pallor.  Conjunctivae and lids normal. External ears normal. Wearing a mask. Oropharynx non-erythematous, mucous membranes moist.  Neck supple.  No thyromegaly or masses palpated.   Lymph: No cervical or supraclavicular lymphadenopathy.  Cardiovascular: Regular rate and rhythm.   Respiratory: Normal respiratory effort.  Clear to auscultation bilaterally.  Abdomen: Non-distended, soft, non-tender  Skin: Warm, dry  Musculoskeletal: Gait is normal.  Moves all extremities well.  Extremities: No leg edema.  Radial pulses 2+ symmetric.   Psych:  Affect/mood is normal, judgement is good, memory is intact, grooming is appropriate.        Assessment and Plan    1. Gastroesophageal reflux disease without esophagitis  2. Indigestion  - Referral to Gastroenterology  His symptoms worse when empty stomach  Avoid triggers  Stay upright for during eating and for 3 hours after eating  Elevated head of bed   Continue omeprazole 20 mg daily        Follow-up:Return if symptoms worsen or fail to improve.    This note was created using voice recognition software. There may be unintended errors in spelling, grammar or content.    "

## 2022-01-17 ENCOUNTER — OFFICE VISIT (OUTPATIENT)
Dept: BEHAVIORAL HEALTH | Facility: CLINIC | Age: 37
End: 2022-01-17
Payer: OTHER GOVERNMENT

## 2022-01-17 NOTE — PROGRESS NOTES
[facility]   Therapy Progress Note        Name: Cameron Jeffries  MRN: [id number]  : 1985  Age: 36 y.o.  Date of assessment: 2022  PCP: Adelso Ortiz M.D.  Persons in attendance: {[facility] HEALTH ATTENDEES:24747482}  Total session time: *** minutes      Topics addressed in psychotherapy include: ***    Objective Observations:   Participation:{[facility] PARTICIPATION MEASURES:39402243}   Grooming:{[facility] GROOMIN}   Cognition:{[facility] ORIENTATION:30652520}   Eye Contact:{[facility] EYE CONTACT:01364300}   Mood:{[facility] MOOD:77474797}   Affect:{[facility] AFFECT:82306206}   Thought Process:{[facility] THOUGHT PROCESS:69760482}   Speech:{[facility] SPEECH:43620040}    Current Risk:   Suicide: {Desc; low/moderate/high:576483}   Homicide: {Desc; low/moderate/high:907710}   Self-Harm: {Desc; low/moderate/high:922326}   Relapse: {Desc; low/moderate/high:609773}   Safety Plan Reviewed: {Response; yes/no/na:76714}    Care Plan Updated: {Yes/No/WC:451131}    Does patient express agreement with the above plan? {Yes/No/WC:455355}     Diagnosis:  No diagnosis found.    Therapeutic Intervention(s): {[facility] THERAPEUTIC INTERVENTION:27302141}    Treatment Goal(s)/Objective(s) addressed: ***     Progress toward Treatment Goals: {[facility] GOAL PROGRESS:61177953}    Referral appointment(s) scheduled? {Yes/No/WC:319358}       Gaby Sanz, LCSW, MSW

## 2022-01-24 ENCOUNTER — OFFICE VISIT (OUTPATIENT)
Dept: BEHAVIORAL HEALTH | Facility: CLINIC | Age: 37
End: 2022-01-24
Payer: OTHER GOVERNMENT

## 2022-01-24 DIAGNOSIS — F33.9 EPISODE OF RECURRENT MAJOR DEPRESSIVE DISORDER, UNSPECIFIED DEPRESSION EPISODE SEVERITY (HCC): ICD-10-CM

## 2022-01-24 DIAGNOSIS — F41.1 GENERALIZED ANXIETY DISORDER: ICD-10-CM

## 2022-01-24 PROCEDURE — 90837 PSYTX W PT 60 MINUTES: CPT | Performed by: SOCIAL WORKER

## 2022-01-24 NOTE — PROGRESS NOTES
[facility]   Therapy Progress Note        Name: Cameron Jeffries  MRN: [id number]  : 1985  Age: 36 y.o.  Date of assessment: 2022  PCP: Adelso Ortiz M.D.  Persons in attendance: Patient  Total session time: 53 minutes      Topics addressed in psychotherapy include: Pt to indiv in office appt. Pt taking Zoloft as prescribed and reports mood improved, less tense and angry. 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 Pt understanding stress put on wife (who left) due to his work schedule and stress on job, very apologetic to her in email and conversation with god; feels positive things happened as result of split. Pt resolve as more empathetic and introspective. Plan: continue to assist with grief/loss/changes. see biweekly.     Objective Observations:   Participation:Active verbal participation, Engaged and Open to feedback   Grooming:Casual   Cognition:Fully Oriented   Eye Contact:Good   Mood:Depressed   Affect: congruent with conversation   Thought Process:Logical   Speech:Rate within normal limits and Volume within normal limits    Current Risk:   Suicide: NA   Homicide: NA   Self-Harm: NA   Relapse: NA   Safety Plan Reviewed: NA    Care Plan Updated: No    Does patient express agreement with the above plan? Yes     Diagnosis:  1. Recurrent MDD  2. ZACHARIAH    Therapeutic Intervention(s): Communication skills, Conflict resolution skills, Interpersonal effectiveness skills, Parenting skills, Stressors assessed and Supportive psychotherapy    Treatment Goal(s)/Objective(s) addressed: grief/loss, separation     Progress toward Treatment Goals: Mild improvement    Referral appointment(s) scheduled? No       Gaby Sanz, LCSW, MSW

## 2022-01-27 ENCOUNTER — TELEMEDICINE (OUTPATIENT)
Dept: BEHAVIORAL HEALTH | Facility: CLINIC | Age: 37
End: 2022-01-27
Payer: OTHER GOVERNMENT

## 2022-01-27 DIAGNOSIS — F41.0 PANIC ATTACKS: ICD-10-CM

## 2022-01-27 DIAGNOSIS — G47.09 OTHER INSOMNIA: ICD-10-CM

## 2022-01-27 DIAGNOSIS — F33.9 EPISODE OF RECURRENT MAJOR DEPRESSIVE DISORDER, UNSPECIFIED DEPRESSION EPISODE SEVERITY (HCC): ICD-10-CM

## 2022-01-27 DIAGNOSIS — F41.1 GENERALIZED ANXIETY DISORDER: ICD-10-CM

## 2022-01-27 DIAGNOSIS — F43.10 PTSD (POST-TRAUMATIC STRESS DISORDER): ICD-10-CM

## 2022-01-27 PROCEDURE — 99214 OFFICE O/P EST MOD 30 MIN: CPT | Mod: 95 | Performed by: PSYCHIATRY & NEUROLOGY

## 2022-01-27 RX ORDER — TRAZODONE HYDROCHLORIDE 50 MG/1
100 TABLET ORAL
Qty: 90 TABLET | Refills: 1 | Status: SHIPPED | OUTPATIENT
Start: 2022-01-27 | End: 2022-03-11

## 2022-01-27 RX ORDER — SERTRALINE HYDROCHLORIDE 100 MG/1
150 TABLET, FILM COATED ORAL DAILY
Qty: 45 TABLET | Refills: 1 | Status: SHIPPED | OUTPATIENT
Start: 2022-01-27 | End: 2022-03-11

## 2022-01-27 RX ORDER — LORAZEPAM 0.5 MG/1
0.5 TABLET ORAL
Qty: 15 TABLET | Refills: 0 | Status: SHIPPED | OUTPATIENT
Start: 2022-01-27 | End: 2022-02-26
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
## 2022-01-31 ENCOUNTER — APPOINTMENT (OUTPATIENT)
Dept: BEHAVIORAL HEALTH | Facility: CLINIC | Age: 37
End: 2022-01-31
Payer: OTHER GOVERNMENT

## 2022-02-09 ENCOUNTER — APPOINTMENT (RX ONLY)
Dept: URBAN - METROPOLITAN AREA CLINIC 6 | Facility: CLINIC | Age: 37
Setting detail: DERMATOLOGY
End: 2022-02-09

## 2022-02-09 DIAGNOSIS — B08.1 MOLLUSCUM CONTAGIOSUM: ICD-10-CM

## 2022-02-09 PROCEDURE — ? COUNSELING

## 2022-02-09 PROCEDURE — 17110 DESTRUCTION B9 LES UP TO 14: CPT

## 2022-02-09 PROCEDURE — ? LIQUID NITROGEN

## 2022-02-09 ASSESSMENT — LOCATION SIMPLE DESCRIPTION DERM
LOCATION SIMPLE: DORSAL PENIS
LOCATION SIMPLE: LEFT THIGH
LOCATION SIMPLE: LEFT SUPRAPUBIC REGION
LOCATION SIMPLE: RIGHT THIGH
LOCATION SIMPLE: RIGHT PENIS
LOCATION SIMPLE: LEFT SCROTUM

## 2022-02-09 ASSESSMENT — LOCATION ZONE DERM
LOCATION ZONE: SCROTUM
LOCATION ZONE: LEG
LOCATION ZONE: PENIS
LOCATION ZONE: TRUNK

## 2022-02-09 ASSESSMENT — LOCATION DETAILED DESCRIPTION DERM
LOCATION DETAILED: RIGHT PENIS BASE
LOCATION DETAILED: RIGHT LATERAL MID-DORSAL PENILE SHAFT
LOCATION DETAILED: LEFT SUPRAPUBIC REGION
LOCATION DETAILED: LEFT SUPERIOR MEDIAL SCROTUM
LOCATION DETAILED: LEFT ANTERIOR PROXIMAL THIGH
LOCATION DETAILED: RIGHT ANTERIOR PROXIMAL THIGH

## 2022-02-09 NOTE — PROCEDURE: LIQUID NITROGEN
Detail Level: Detailed
Include Z78.9 (Other Specified Conditions Influencing Health Status) As An Associated Diagnosis?: No
Show Spray Paint Technique Variable?: Yes
Post-Care Instructions: I reviewed with the patient in detail post-care instructions. Patient is to wear sunprotection, and avoid picking at any of the treated lesions. Pt may apply Vaseline to crusted or scabbing areas.
Spray Paint Text: The liquid nitrogen was applied to the skin utilizing a spray paint frosting technique.
Consent: The patient's consent was obtained including but not limited to risks of crusting, scabbing, blistering, scarring, darker or lighter pigmentary change, recurrence, incomplete removal and infection.
Medical Necessity Clause: This procedure was medically necessary because the lesions that were treated were:
Medical Necessity Information: It is in your best interest to select a reason for this procedure from the list below. All of these items fulfill various CMS LCD requirements except the new and changing color options.

## 2022-02-14 ENCOUNTER — OFFICE VISIT (OUTPATIENT)
Dept: BEHAVIORAL HEALTH | Facility: CLINIC | Age: 37
End: 2022-02-14
Payer: OTHER GOVERNMENT

## 2022-02-14 DIAGNOSIS — F41.1 GENERALIZED ANXIETY DISORDER: ICD-10-CM

## 2022-02-14 DIAGNOSIS — F33.9 EPISODE OF RECURRENT MAJOR DEPRESSIVE DISORDER, UNSPECIFIED DEPRESSION EPISODE SEVERITY (HCC): ICD-10-CM

## 2022-02-14 DIAGNOSIS — F43.10 PTSD (POST-TRAUMATIC STRESS DISORDER): ICD-10-CM

## 2022-02-14 PROCEDURE — 90837 PSYTX W PT 60 MINUTES: CPT | Performed by: SOCIAL WORKER

## 2022-02-28 ENCOUNTER — OFFICE VISIT (OUTPATIENT)
Dept: BEHAVIORAL HEALTH | Facility: CLINIC | Age: 37
End: 2022-02-28
Payer: OTHER GOVERNMENT

## 2022-02-28 DIAGNOSIS — F41.1 GENERALIZED ANXIETY DISORDER: ICD-10-CM

## 2022-02-28 PROCEDURE — 90837 PSYTX W PT 60 MINUTES: CPT | Performed by: SOCIAL WORKER

## 2022-02-28 NOTE — PROGRESS NOTES
"[facility]   Therapy Progress Note        Name: Cameron Jeffries  MRN: [id number]  : 1985  Age: 36 y.o.  Date of assessment: 2022  PCP: Adelso Ortiz M.D.  Persons in attendance: Patient  Total session time: 55 minutes      Topics addressed in psychotherapy include: Pt to indiv in office appt. Pt focused on what to except when dating and wanting partner to \"help\" him. Assisted pt with understanding divorce trauma from child perspective, so as not to rush someone new with kids, older son told fa it was not ok to date. Pt actively enrolling son in Autism eval/tx. Coached pt on ages and stages of developments. Pt taking on new employment ventures. Plan: See bi weekly.     Objective Observations:   Participation:Active verbal participation, Attentive, Engaged and Open to feedback   Grooming:Neat   Cognition:Fully Oriented   Eye Contact:Good   Mood:Euthymic   Affect:Flexible   Thought Process:Logical, goal directed   Speech:Rate within normal limits and Volume within normal limits    Current Risk:   Suicide: NA   Homicide: NA   Self-Harm: NA   Relapse: NA   Safety Plan Reviewed: NA    Care Plan Updated: No    Does patient express agreement with the above plan? Yes     Diagnosis:  1. Recurrent MDD  2. ZACHARIAH    Therapeutic Intervention(s): Cognitive modification and Parenting skills    Treatment Goal(s)/Objective(s) addressed: depression     Progress toward Treatment Goals: Mild improvement    Referral appointment(s) scheduled? No       Gaby Sanz, LCSW, MSW    "

## 2022-03-09 ENCOUNTER — APPOINTMENT (RX ONLY)
Dept: URBAN - METROPOLITAN AREA CLINIC 6 | Facility: CLINIC | Age: 37
Setting detail: DERMATOLOGY
End: 2022-03-09

## 2022-03-09 DIAGNOSIS — B08.1 MOLLUSCUM CONTAGIOSUM: ICD-10-CM

## 2022-03-09 PROCEDURE — ? LIQUID NITROGEN

## 2022-03-09 PROCEDURE — 17110 DESTRUCTION B9 LES UP TO 14: CPT

## 2022-03-09 ASSESSMENT — LOCATION DETAILED DESCRIPTION DERM: LOCATION DETAILED: LEFT ANTERIOR SCROTUM

## 2022-03-09 ASSESSMENT — LOCATION ZONE DERM: LOCATION ZONE: GENITALIA

## 2022-03-09 ASSESSMENT — LOCATION SIMPLE DESCRIPTION DERM: LOCATION SIMPLE: SCROTUM

## 2022-03-09 NOTE — PROCEDURE: LIQUID NITROGEN
Add 52 Modifier (Optional): no
Show Spray Paint Technique Variable?: Yes
Consent: The patient's consent was obtained including but not limited to risks of crusting, scabbing, blistering, scarring, darker or lighter pigmentary change, recurrence, incomplete removal and infection.
Post-Care Instructions: I reviewed with the patient in detail post-care instructions. Patient is to wear sunprotection, and avoid picking at any of the treated lesions. Pt may apply Vaseline to crusted or scabbing areas.
Medical Necessity Information: It is in your best interest to select a reason for this procedure from the list below. All of these items fulfill various CMS LCD requirements except the new and changing color options.
Detail Level: Detailed
Medical Necessity Clause: This procedure was medically necessary because the lesions that were treated were:
Spray Paint Text: The liquid nitrogen was applied to the skin utilizing a spray paint frosting technique.

## 2022-03-10 ENCOUNTER — APPOINTMENT (OUTPATIENT)
Dept: BEHAVIORAL HEALTH | Facility: CLINIC | Age: 37
End: 2022-03-10
Payer: OTHER GOVERNMENT

## 2022-03-11 ENCOUNTER — TELEMEDICINE (OUTPATIENT)
Dept: BEHAVIORAL HEALTH | Facility: CLINIC | Age: 37
End: 2022-03-11
Payer: OTHER GOVERNMENT

## 2022-03-11 DIAGNOSIS — F33.9 EPISODE OF RECURRENT MAJOR DEPRESSIVE DISORDER, UNSPECIFIED DEPRESSION EPISODE SEVERITY (HCC): ICD-10-CM

## 2022-03-11 DIAGNOSIS — F41.0 PANIC ATTACKS: ICD-10-CM

## 2022-03-11 DIAGNOSIS — F43.10 PTSD (POST-TRAUMATIC STRESS DISORDER): ICD-10-CM

## 2022-03-11 DIAGNOSIS — F41.1 GENERALIZED ANXIETY DISORDER: ICD-10-CM

## 2022-03-11 DIAGNOSIS — G47.09 OTHER INSOMNIA: ICD-10-CM

## 2022-03-11 PROCEDURE — 90833 PSYTX W PT W E/M 30 MIN: CPT | Mod: 95 | Performed by: PSYCHIATRY & NEUROLOGY

## 2022-03-11 PROCEDURE — 99214 OFFICE O/P EST MOD 30 MIN: CPT | Mod: 95 | Performed by: PSYCHIATRY & NEUROLOGY

## 2022-03-11 RX ORDER — LORAZEPAM 0.5 MG/1
0.5 TABLET ORAL
Qty: 15 TABLET | Refills: 0 | Status: SHIPPED | OUTPATIENT
Start: 2022-03-11 | End: 2022-04-08 | Stop reason: SDUPTHER

## 2022-03-11 RX ORDER — TRAZODONE HYDROCHLORIDE 50 MG/1
50 TABLET ORAL NIGHTLY PRN
Qty: 90 TABLET | Refills: 1 | Status: SHIPPED | OUTPATIENT
Start: 2022-03-11 | End: 2022-04-08 | Stop reason: SDUPTHER

## 2022-03-11 RX ORDER — SERTRALINE HYDROCHLORIDE 100 MG/1
200 TABLET, FILM COATED ORAL DAILY
Qty: 60 TABLET | Refills: 1 | Status: SHIPPED | OUTPATIENT
Start: 2022-03-11 | End: 2022-04-08 | Stop reason: SDUPTHER
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
## 2022-03-14 ENCOUNTER — APPOINTMENT (OUTPATIENT)
Dept: BEHAVIORAL HEALTH | Facility: CLINIC | Age: 37
End: 2022-03-14
Payer: OTHER GOVERNMENT

## 2022-03-28 ENCOUNTER — APPOINTMENT (OUTPATIENT)
Dept: BEHAVIORAL HEALTH | Facility: CLINIC | Age: 37
End: 2022-03-28
Payer: OTHER GOVERNMENT

## 2022-04-08 ENCOUNTER — TELEMEDICINE (OUTPATIENT)
Dept: BEHAVIORAL HEALTH | Facility: CLINIC | Age: 37
End: 2022-04-08
Payer: OTHER GOVERNMENT

## 2022-04-08 DIAGNOSIS — F41.0 PANIC ATTACKS: ICD-10-CM

## 2022-04-08 DIAGNOSIS — F43.10 PTSD (POST-TRAUMATIC STRESS DISORDER): ICD-10-CM

## 2022-04-08 DIAGNOSIS — F41.1 GENERALIZED ANXIETY DISORDER: ICD-10-CM

## 2022-04-08 DIAGNOSIS — F33.9 EPISODE OF RECURRENT MAJOR DEPRESSIVE DISORDER, UNSPECIFIED DEPRESSION EPISODE SEVERITY (HCC): ICD-10-CM

## 2022-04-08 DIAGNOSIS — G47.09 OTHER INSOMNIA: ICD-10-CM

## 2022-04-08 PROCEDURE — 99214 OFFICE O/P EST MOD 30 MIN: CPT | Mod: GT | Performed by: PSYCHIATRY & NEUROLOGY

## 2022-04-08 RX ORDER — SERTRALINE HYDROCHLORIDE 100 MG/1
200 TABLET, FILM COATED ORAL DAILY
Qty: 180 TABLET | Refills: 2 | Status: SHIPPED | OUTPATIENT
Start: 2022-04-08

## 2022-04-08 RX ORDER — LORAZEPAM 0.5 MG/1
0.5 TABLET ORAL
Qty: 15 TABLET | Refills: 0 | Status: SHIPPED | OUTPATIENT
Start: 2022-04-08 | End: 2022-05-08

## 2022-04-08 RX ORDER — TRAZODONE HYDROCHLORIDE 50 MG/1
50 TABLET ORAL NIGHTLY PRN
Qty: 180 TABLET | Refills: 2 | Status: SHIPPED | OUTPATIENT
Start: 2022-04-08
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
## 2022-05-09 ENCOUNTER — APPOINTMENT (OUTPATIENT)
Dept: MEDICAL GROUP | Facility: MEDICAL CENTER | Age: 37
End: 2022-05-09
Payer: OTHER GOVERNMENT

## 2025-06-13 NOTE — PROGRESS NOTES
"[facility]   Therapy Progress Note        Name: Cameron Jeffries  MRN: [id number]  : 1985  Age: 36 y.o.  Date of assessment: 2022  PCP: Adelso Ortiz M.D.  Persons in attendance: Patient  Total session time: 55 minutes      Topics addressed in psychotherapy include: Pt to indiv in office appt. Pt and writer processed court, feeling sad, issues for boys with divorce, some tearfullness upon transitioning, feelings around \"guilty parent\" and expectations. Pt attending divorce group. Encouraged multiple connections for self/boys and communicating with riley with ex in behalf of children. Plan: See biweekly.     Objective Observations:   Participation:Active verbal participation, Engaged and Open to feedback   Grooming:Neat   Cognition:Fully Oriented   Eye Contact:Good   Mood:Euthymic   Affect:Flexible and Congruent with content   Thought Process:Goal-directed   Speech:Rate within normal limits and Volume within normal limits    Current Risk:   Suicide: NA   Homicide: NA   Self-Harm: NA   Relapse: NA   Safety Plan Reviewed: NA    Care Plan Updated: No    Does patient express agreement with the above plan? Yes     Diagnosis:  1. MDD  2. ZACHARIAH  3. PTSD    Therapeutic Intervention(s): Communication skills, Interpersonal effectiveness skills and Parenting/familial roles addressed    Treatment Goal(s)/Objective(s) addressed: grief/loss     Progress toward Treatment Goals: Mild improvement    Referral appointment(s) scheduled? No       Gaby Sanz, LCSW, MSW    " (E4) spontaneous